# Patient Record
Sex: MALE | Race: WHITE | Employment: FULL TIME | URBAN - METROPOLITAN AREA
[De-identification: names, ages, dates, MRNs, and addresses within clinical notes are randomized per-mention and may not be internally consistent; named-entity substitution may affect disease eponyms.]

---

## 2020-05-07 ENCOUNTER — APPOINTMENT (OUTPATIENT)
Dept: GENERAL RADIOLOGY | Age: 55
DRG: 433 | End: 2020-05-07
Payer: COMMERCIAL

## 2020-05-07 ENCOUNTER — APPOINTMENT (OUTPATIENT)
Dept: ULTRASOUND IMAGING | Age: 55
DRG: 433 | End: 2020-05-07
Payer: COMMERCIAL

## 2020-05-07 ENCOUNTER — HOSPITAL ENCOUNTER (INPATIENT)
Age: 55
LOS: 5 days | Discharge: HOME | DRG: 433 | End: 2020-05-12
Attending: EMERGENCY MEDICINE | Admitting: INTERNAL MEDICINE
Payer: COMMERCIAL

## 2020-05-07 ENCOUNTER — APPOINTMENT (OUTPATIENT)
Dept: CT IMAGING | Age: 55
DRG: 433 | End: 2020-05-07
Payer: COMMERCIAL

## 2020-05-07 LAB
ABO/RH: NORMAL
ACETAMINOPHEN LEVEL: <5 MCG/ML (ref 10–30)
ALBUMIN FLUID: 0.8 G/DL
ALBUMIN SERPL-MCNC: 3.1 G/DL (ref 3.5–5.2)
ALP BLD-CCNC: 184 U/L (ref 40–129)
ALT SERPL-CCNC: 51 U/L (ref 0–40)
AMMONIA: 51 UMOL/L (ref 16–60)
AMYLASE FLUID: 7 U/L
ANION GAP SERPL CALCULATED.3IONS-SCNC: 19 MMOL/L (ref 7–16)
ANISOCYTOSIS: ABNORMAL
ANTIBODY SCREEN: NORMAL
APPEARANCE FLUID: CLEAR
APTT: 32.6 SEC (ref 24.5–35.1)
AST SERPL-CCNC: 285 U/L (ref 0–39)
BASOPHILS ABSOLUTE: 0.08 E9/L (ref 0–0.2)
BASOPHILS RELATIVE PERCENT: 0.9 % (ref 0–2)
BILIRUB SERPL-MCNC: 4.8 MG/DL (ref 0–1.2)
BILIRUBIN DIRECT: 1.3 MG/DL (ref 0–0.3)
BILIRUBIN, INDIRECT: 3.5 MG/DL (ref 0–1)
BUN BLDV-MCNC: 13 MG/DL (ref 6–20)
CALCIUM SERPL-MCNC: 8.8 MG/DL (ref 8.6–10.2)
CELL COUNT FLUID TYPE: NORMAL
CHLORIDE BLD-SCNC: 83 MMOL/L (ref 98–107)
CO2: 24 MMOL/L (ref 22–29)
COLOR FLUID: YELLOW
CREAT SERPL-MCNC: 0.6 MG/DL (ref 0.7–1.2)
EOSINOPHILS ABSOLUTE: 0 E9/L (ref 0.05–0.5)
EOSINOPHILS RELATIVE PERCENT: 0.1 % (ref 0–6)
ETHANOL: 24 MG/DL (ref 0–0.08)
FLUID TYPE: NORMAL
GFR AFRICAN AMERICAN: >60
GFR NON-AFRICAN AMERICAN: >60 ML/MIN/1.73
GLUCOSE BLD-MCNC: 116 MG/DL (ref 74–99)
HCT VFR BLD CALC: 34.5 % (ref 37–54)
HEMOGLOBIN: 12.4 G/DL (ref 12.5–16.5)
INR BLD: 1.4
LACTIC ACID: 4.9 MMOL/L (ref 0.5–2.2)
LACTIC ACID: 6.3 MMOL/L (ref 0.5–2.2)
LD, FLUID: 60 U/L
LIPASE: 46 U/L (ref 13–60)
LYMPHOCYTES ABSOLUTE: 0.55 E9/L (ref 1.5–4)
LYMPHOCYTES RELATIVE PERCENT: 6.1 % (ref 20–42)
MCH RBC QN AUTO: 38.9 PG (ref 26–35)
MCHC RBC AUTO-ENTMCNC: 35.9 % (ref 32–34.5)
MCV RBC AUTO: 108.2 FL (ref 80–99.9)
MONOCYTE, FLUID: 89 %
MONOCYTES ABSOLUTE: 0.18 E9/L (ref 0.1–0.95)
MONOCYTES RELATIVE PERCENT: 1.7 % (ref 2–12)
NEUTROPHIL, FLUID: 11 %
NEUTROPHILS ABSOLUTE: 8.28 E9/L (ref 1.8–7.3)
NEUTROPHILS RELATIVE PERCENT: 91.3 % (ref 43–80)
NUCLEATED CELLS FLUID: 18 /UL
OSMOLALITY: 277 MOSM/KG (ref 285–310)
PDW BLD-RTO: 18.4 FL (ref 11.5–15)
PLATELET # BLD: 147 E9/L (ref 130–450)
PMV BLD AUTO: 11 FL (ref 7–12)
POIKILOCYTES: ABNORMAL
POTASSIUM REFLEX MAGNESIUM: 5.6 MMOL/L (ref 3.5–5)
PRO-BNP: 353 PG/ML (ref 0–125)
PROCALCITONIN: 0.19 NG/ML (ref 0–0.08)
PROTEIN FLUID: 1.4 G/DL
PROTHROMBIN TIME: 16 SEC (ref 9.3–12.4)
RBC # BLD: 3.19 E12/L (ref 3.8–5.8)
RBC FLUID: <2000 /UL
SALICYLATE, SERUM: <0.3 MG/DL (ref 0–30)
SEDIMENTATION RATE, ERYTHROCYTE: 100 MM/HR (ref 0–15)
SODIUM BLD-SCNC: 126 MMOL/L (ref 132–146)
TARGET CELLS: ABNORMAL
TOTAL PROTEIN: 8.7 G/DL (ref 6.4–8.3)
TRICYCLIC ANTIDEPRESSANTS SCREEN SERUM: NEGATIVE NG/ML
TROPONIN: <0.01 NG/ML (ref 0–0.03)
WBC # BLD: 9.1 E9/L (ref 4.5–11.5)

## 2020-05-07 PROCEDURE — 84157 ASSAY OF PROTEIN OTHER: CPT

## 2020-05-07 PROCEDURE — 83880 ASSAY OF NATRIURETIC PEPTIDE: CPT

## 2020-05-07 PROCEDURE — 85610 PROTHROMBIN TIME: CPT

## 2020-05-07 PROCEDURE — 6360000002 HC RX W HCPCS: Performed by: INTERNAL MEDICINE

## 2020-05-07 PROCEDURE — 87040 BLOOD CULTURE FOR BACTERIA: CPT

## 2020-05-07 PROCEDURE — 2500000003 HC RX 250 WO HCPCS: Performed by: INTERNAL MEDICINE

## 2020-05-07 PROCEDURE — 49083 ABD PARACENTESIS W/IMAGING: CPT

## 2020-05-07 PROCEDURE — 74177 CT ABD & PELVIS W/CONTRAST: CPT

## 2020-05-07 PROCEDURE — 6360000002 HC RX W HCPCS: Performed by: STUDENT IN AN ORGANIZED HEALTH CARE EDUCATION/TRAINING PROGRAM

## 2020-05-07 PROCEDURE — 71046 X-RAY EXAM CHEST 2 VIEWS: CPT

## 2020-05-07 PROCEDURE — 85025 COMPLETE CBC W/AUTO DIFF WBC: CPT

## 2020-05-07 PROCEDURE — 76705 ECHO EXAM OF ABDOMEN: CPT

## 2020-05-07 PROCEDURE — 83690 ASSAY OF LIPASE: CPT

## 2020-05-07 PROCEDURE — 83605 ASSAY OF LACTIC ACID: CPT

## 2020-05-07 PROCEDURE — 84145 PROCALCITONIN (PCT): CPT

## 2020-05-07 PROCEDURE — 80307 DRUG TEST PRSMV CHEM ANLYZR: CPT

## 2020-05-07 PROCEDURE — 89051 BODY FLUID CELL COUNT: CPT

## 2020-05-07 PROCEDURE — 87070 CULTURE OTHR SPECIMN AEROBIC: CPT

## 2020-05-07 PROCEDURE — 1200000000 HC SEMI PRIVATE

## 2020-05-07 PROCEDURE — 0W9G3ZZ DRAINAGE OF PERITONEAL CAVITY, PERCUTANEOUS APPROACH: ICD-10-PCS | Performed by: EMERGENCY MEDICINE

## 2020-05-07 PROCEDURE — 86900 BLOOD TYPING SEROLOGIC ABO: CPT

## 2020-05-07 PROCEDURE — 96365 THER/PROPH/DIAG IV INF INIT: CPT

## 2020-05-07 PROCEDURE — 85730 THROMBOPLASTIN TIME PARTIAL: CPT

## 2020-05-07 PROCEDURE — 83540 ASSAY OF IRON: CPT

## 2020-05-07 PROCEDURE — 99285 EMERGENCY DEPT VISIT HI MDM: CPT

## 2020-05-07 PROCEDURE — 88305 TISSUE EXAM BY PATHOLOGIST: CPT

## 2020-05-07 PROCEDURE — 80076 HEPATIC FUNCTION PANEL: CPT

## 2020-05-07 PROCEDURE — 87205 SMEAR GRAM STAIN: CPT

## 2020-05-07 PROCEDURE — 86850 RBC ANTIBODY SCREEN: CPT

## 2020-05-07 PROCEDURE — 94640 AIRWAY INHALATION TREATMENT: CPT

## 2020-05-07 PROCEDURE — 6360000004 HC RX CONTRAST MEDICATION: Performed by: RADIOLOGY

## 2020-05-07 PROCEDURE — 80074 ACUTE HEPATITIS PANEL: CPT

## 2020-05-07 PROCEDURE — 83930 ASSAY OF BLOOD OSMOLALITY: CPT

## 2020-05-07 PROCEDURE — 82390 ASSAY OF CERULOPLASMIN: CPT

## 2020-05-07 PROCEDURE — 2580000003 HC RX 258: Performed by: INTERNAL MEDICINE

## 2020-05-07 PROCEDURE — P9047 ALBUMIN (HUMAN), 25%, 50ML: HCPCS | Performed by: STUDENT IN AN ORGANIZED HEALTH CARE EDUCATION/TRAINING PROGRAM

## 2020-05-07 PROCEDURE — C9113 INJ PANTOPRAZOLE SODIUM, VIA: HCPCS | Performed by: INTERNAL MEDICINE

## 2020-05-07 PROCEDURE — 88112 CYTOPATH CELL ENHANCE TECH: CPT

## 2020-05-07 PROCEDURE — 86901 BLOOD TYPING SEROLOGIC RH(D): CPT

## 2020-05-07 PROCEDURE — 82042 OTHER SOURCE ALBUMIN QUAN EA: CPT

## 2020-05-07 PROCEDURE — 82728 ASSAY OF FERRITIN: CPT

## 2020-05-07 PROCEDURE — 83615 LACTATE (LD) (LDH) ENZYME: CPT

## 2020-05-07 PROCEDURE — 36415 COLL VENOUS BLD VENIPUNCTURE: CPT

## 2020-05-07 PROCEDURE — G0480 DRUG TEST DEF 1-7 CLASSES: HCPCS

## 2020-05-07 PROCEDURE — 84484 ASSAY OF TROPONIN QUANT: CPT

## 2020-05-07 PROCEDURE — 85651 RBC SED RATE NONAUTOMATED: CPT

## 2020-05-07 PROCEDURE — 82150 ASSAY OF AMYLASE: CPT

## 2020-05-07 PROCEDURE — 93005 ELECTROCARDIOGRAM TRACING: CPT | Performed by: STUDENT IN AN ORGANIZED HEALTH CARE EDUCATION/TRAINING PROGRAM

## 2020-05-07 PROCEDURE — 74018 RADEX ABDOMEN 1 VIEW: CPT

## 2020-05-07 PROCEDURE — 83550 IRON BINDING TEST: CPT

## 2020-05-07 PROCEDURE — 82140 ASSAY OF AMMONIA: CPT

## 2020-05-07 PROCEDURE — 80048 BASIC METABOLIC PNL TOTAL CA: CPT

## 2020-05-07 RX ORDER — GUAIFENESIN 600 MG/1
600 TABLET, EXTENDED RELEASE ORAL 2 TIMES DAILY
Status: DISCONTINUED | OUTPATIENT
Start: 2020-05-07 | End: 2020-05-12 | Stop reason: HOSPADM

## 2020-05-07 RX ORDER — SODIUM CHLORIDE 0.9 % (FLUSH) 0.9 %
10 SYRINGE (ML) INJECTION EVERY 12 HOURS SCHEDULED
Status: DISCONTINUED | OUTPATIENT
Start: 2020-05-07 | End: 2020-05-12 | Stop reason: HOSPADM

## 2020-05-07 RX ORDER — ALBUMIN, HUMAN INJ 5% 5 %
25 SOLUTION INTRAVENOUS ONCE
Status: DISCONTINUED | OUTPATIENT
Start: 2020-05-07 | End: 2020-05-07

## 2020-05-07 RX ORDER — OCTREOTIDE ACETATE 50 UG/ML
50 INJECTION, SOLUTION INTRAVENOUS; SUBCUTANEOUS ONCE
Status: COMPLETED | OUTPATIENT
Start: 2020-05-07 | End: 2020-05-08

## 2020-05-07 RX ORDER — ACETAMINOPHEN 650 MG/1
650 SUPPOSITORY RECTAL EVERY 6 HOURS PRN
Status: CANCELLED | OUTPATIENT
Start: 2020-05-07

## 2020-05-07 RX ORDER — FOLIC ACID 1 MG/1
1 TABLET ORAL DAILY
Status: DISCONTINUED | OUTPATIENT
Start: 2020-05-08 | End: 2020-05-10

## 2020-05-07 RX ORDER — NADOLOL 20 MG/1
20 TABLET ORAL DAILY
Status: DISCONTINUED | OUTPATIENT
Start: 2020-05-07 | End: 2020-05-09

## 2020-05-07 RX ORDER — ALBUMIN (HUMAN) 12.5 G/50ML
50 SOLUTION INTRAVENOUS ONCE
Status: COMPLETED | OUTPATIENT
Start: 2020-05-07 | End: 2020-05-07

## 2020-05-07 RX ORDER — PANTOPRAZOLE SODIUM 40 MG/10ML
40 INJECTION, POWDER, LYOPHILIZED, FOR SOLUTION INTRAVENOUS 2 TIMES DAILY
Status: DISCONTINUED | OUTPATIENT
Start: 2020-05-07 | End: 2020-05-08

## 2020-05-07 RX ORDER — SODIUM CHLORIDE 0.9 % (FLUSH) 0.9 %
10 SYRINGE (ML) INJECTION PRN
Status: DISCONTINUED | OUTPATIENT
Start: 2020-05-07 | End: 2020-05-12 | Stop reason: HOSPADM

## 2020-05-07 RX ORDER — ALBUTEROL SULFATE 2.5 MG/3ML
2.5 SOLUTION RESPIRATORY (INHALATION) EVERY 4 HOURS PRN
Status: DISCONTINUED | OUTPATIENT
Start: 2020-05-07 | End: 2020-05-12 | Stop reason: HOSPADM

## 2020-05-07 RX ORDER — THIAMINE MONONITRATE (VIT B1) 100 MG
100 TABLET ORAL DAILY
Status: DISCONTINUED | OUTPATIENT
Start: 2020-05-08 | End: 2020-05-12 | Stop reason: HOSPADM

## 2020-05-07 RX ORDER — ACETAMINOPHEN 325 MG/1
650 TABLET ORAL EVERY 6 HOURS PRN
Status: CANCELLED | OUTPATIENT
Start: 2020-05-07

## 2020-05-07 RX ORDER — SODIUM CHLORIDE 9 MG/ML
10 INJECTION INTRAVENOUS 2 TIMES DAILY
Status: DISCONTINUED | OUTPATIENT
Start: 2020-05-07 | End: 2020-05-08

## 2020-05-07 RX ADMIN — ALBUTEROL SULFATE 2.5 MG: 2.5 SOLUTION RESPIRATORY (INHALATION) at 23:31

## 2020-05-07 RX ADMIN — IOPAMIDOL 75 ML: 755 INJECTION, SOLUTION INTRAVENOUS at 22:14

## 2020-05-07 RX ADMIN — METRONIDAZOLE 500 MG: 500 INJECTION, SOLUTION INTRAVENOUS at 22:57

## 2020-05-07 RX ADMIN — WATER 1 G: 1 INJECTION INTRAMUSCULAR; INTRAVENOUS; SUBCUTANEOUS at 21:43

## 2020-05-07 RX ADMIN — SODIUM CHLORIDE 10 ML: 9 INJECTION INTRAMUSCULAR; INTRAVENOUS; SUBCUTANEOUS at 21:52

## 2020-05-07 RX ADMIN — PANTOPRAZOLE SODIUM 40 MG: 40 INJECTION, POWDER, FOR SOLUTION INTRAVENOUS at 21:52

## 2020-05-07 RX ADMIN — Medication 10 ML: at 21:43

## 2020-05-07 RX ADMIN — ALBUMIN (HUMAN) 50 G: 0.25 INJECTION, SOLUTION INTRAVENOUS at 18:10

## 2020-05-07 ASSESSMENT — ENCOUNTER SYMPTOMS
EYE DISCHARGE: 0
RECTAL PAIN: 0
EYE PAIN: 0
COUGH: 0
CHOKING: 0
EYE REDNESS: 0
STRIDOR: 0
WHEEZING: 0
PHOTOPHOBIA: 0
DIARRHEA: 0
CONSTIPATION: 0
ABDOMINAL PAIN: 0
SHORTNESS OF BREATH: 0
SINUS PRESSURE: 0
NAUSEA: 0
VOMITING: 0
BACK PAIN: 0
BLOOD IN STOOL: 0
SORE THROAT: 0
CHEST TIGHTNESS: 0
APNEA: 0
COLOR CHANGE: 0
ABDOMINAL DISTENTION: 0

## 2020-05-07 ASSESSMENT — PAIN DESCRIPTION - DESCRIPTORS: DESCRIPTORS: ACHING;DISCOMFORT;SORE

## 2020-05-07 ASSESSMENT — PAIN DESCRIPTION - PAIN TYPE: TYPE: ACUTE PAIN

## 2020-05-07 ASSESSMENT — PAIN DESCRIPTION - PROGRESSION: CLINICAL_PROGRESSION: GRADUALLY WORSENING

## 2020-05-07 ASSESSMENT — PAIN DESCRIPTION - FREQUENCY: FREQUENCY: INTERMITTENT

## 2020-05-07 ASSESSMENT — PAIN DESCRIPTION - LOCATION: LOCATION: LEG

## 2020-05-07 ASSESSMENT — PAIN DESCRIPTION - ORIENTATION: ORIENTATION: RIGHT;LEFT

## 2020-05-07 ASSESSMENT — PAIN DESCRIPTION - ONSET: ONSET: GRADUAL

## 2020-05-07 ASSESSMENT — PAIN - FUNCTIONAL ASSESSMENT: PAIN_FUNCTIONAL_ASSESSMENT: PREVENTS OR INTERFERES SOME ACTIVE ACTIVITIES AND ADLS

## 2020-05-07 ASSESSMENT — PAIN SCALES - GENERAL: PAINLEVEL_OUTOF10: 6

## 2020-05-07 NOTE — ED NOTES
The procedural consent form for the abdominal paracentesis was signed by the pt and placed into his lite chart.       Ian Palm RN  05/07/20 0690

## 2020-05-07 NOTE — ED PROVIDER NOTES
72-year-old male with a past medical history of alcoholic liver cirrhosis not on the liver transplant list who is a daily drinker presents with abdominal swelling. Patient states for the last 3 months he has had aggressively worsening abdominal distention. States that he is gained 80 pounds. Per patient, he was previously seen at [facility] in February where he was peritoneal tap. He says he got 4 L of fluid out of his belly. States that since that time he is progressively worsened. No alleviating or exacerbating factors. Does admit to shortness of breath concomittant with his abdominal distention. Denies fever, chills, nausea, vomiting, chest pain, abdominal pain, dysuria, hematuria, diarrhea and constipation. The history is provided by the patient. Review of Systems   Constitutional: Negative for activity change, appetite change, chills, diaphoresis, fatigue, fever and unexpected weight change. HENT: Negative for congestion, ear discharge, ear pain, mouth sores, sinus pressure, sneezing and sore throat. Eyes: Negative for photophobia, pain, discharge, redness and visual disturbance. Respiratory: Negative for apnea, cough, choking, chest tightness, shortness of breath, wheezing and stridor. Cardiovascular: Negative for chest pain and leg swelling. Gastrointestinal: Negative for abdominal distention, abdominal pain, blood in stool, constipation, diarrhea, nausea, rectal pain and vomiting. Genitourinary: Negative for dysuria and frequency. Musculoskeletal: Negative for arthralgias, back pain, neck pain and neck stiffness. Skin: Negative for color change, pallor, rash and wound. Neurological: Negative for dizziness, facial asymmetry, weakness and headaches. Hematological: Negative for adenopathy. Psychiatric/Behavioral: Negative for agitation, behavioral problems and confusion. All other systems reviewed and are negative.        Physical Exam  Vitals signs and nursing note Mental Status: He is alert and oriented to person, place, and time. Cranial Nerves: No cranial nerve deficit. Sensory: No sensory deficit. Motor: No weakness. Coordination: Coordination normal.   Psychiatric:         Mood and Affect: Mood normal.                      Procedures     MDM  Number of Diagnoses or Management Options  Alcoholic cirrhosis of liver with ascites [facility]):   Diagnosis management comments: 82-year-old male past medical history of liver cirrhosis presents with abdominal distention. Patient is being evaluated for but not limited to liver cirrhosis with ascites, SBP, and hepatorenal syndrome. Depending on the patient's INR patient will have a paracentesis in the department. Patient's INR is 1.4. Patient was consented to have a paracentesis done department. A total of 5 and half liters of clear yellow fluid have been drained from the patient. 50 mg of albumin were given. Patient       ED Course as of May 07 2336   Thu May 07, 2020   1539 Paracentesis in the department depending on his coag factors. He is currently sitting comfortably in room. [JV]   1559 EKG read by me. Sinus tachycardia. Normal axis deviation. INR: 1.4 [JV]   1627 Troponin: <0.01 [JV]   1627 Patient is currently giving consent. Patient will get albumin during tap. Albumin(!): 3.1 [JV]   1631 Meld score of 23 which implies a 19.6% 3-month mortality    [JV]   1825 Bedside paracentesis performed. Using ultrasound. 5 and half liters of ascites has been removed from patient's abdomen. On physical exam his abdomen is less tense. Patient states that he feels \"much better. \"    [JV]   [phone] with Dr. Xavi Rooney. He will accept patient. Will speak to Dr. Vasyl Mcbride resident about patient admission. Massiel Mcbride will evaluate patient in the department. Patient is currently afebrile in no acute distress.      [JV]   1901 He was informed that he will be admitted to the hospital for observation. He is agreeable with plan. [JV]      ED Course User Index  [JV] Yeimi Larsen MD      ED Course as of May 07 2336   Thu May 07, 2020   1539 Paracentesis in the department depending on his coag factors. He is currently sitting comfortably in room. [JV]   1559 EKG read by me. Sinus tachycardia. Normal axis deviation. INR: 1.4 [JV]   1627 Troponin: <0.01 [JV]   1627 Patient is currently giving consent. Patient will get albumin during tap. Albumin(!): 3.1 [JV]   1631 Meld score of 23 which implies a 19.6% 3-month mortality    [JV]   1825 Bedside paracentesis performed. Using ultrasound. 5 and half liters of ascites has been removed from patient's abdomen. On physical exam his abdomen is less tense. Patient states that he feels \"much better. \"    [JV]   [phone] with Dr. Juany Bo. He will accept patient. Will speak to Dr. Jax Sheikh resident about patient admission. Shante Sheikh will evaluate patient in the department. Patient is currently afebrile in no acute distress. [JV]   1907 He was informed that he will be admitted to the hospital for observation. He is agreeable with plan. [JV]      ED Course User Index  [JV] Yeimi Larsen MD       --------------------------------------------- PAST HISTORY ---------------------------------------------  Past Medical History:  has a past medical history of Alcoholism ([address] Ascites, Cirrhosis ([facility] [address] Hypertension, and Noncompliance. Past Surgical History:  has a past surgical history that includes Abdominal exploration surgery (03/22/2014); Paracentesis (2017); Paracentesis (2014); Abdomen surgery; and Endoscopy, colon, diagnostic. Social History:  reports that he has been smoking cigars. He has a 20.00 pack-year smoking history. His smokeless tobacco use includes chew. He reports current alcohol use of about 21.0 standard drinks of alcohol per week. He reports that he does not use drugs.     Family History: family history includes Diabetes in his father; Heart Disease in his mother; High Blood Pressure in his maternal grandfather and maternal grandmother; Other in his sister. The patients home medications have been reviewed. Allergies: Patient has no known allergies.     -------------------------------------------------- RESULTS -------------------------------------------------    LABS:  Results for orders placed or performed during the hospital encounter of 05/07/20   CBC Auto Differential   Result Value Ref Range    WBC 9.1 4.5 - 11.5 E9/L    RBC 3.19 (L) 3.80 - 5.80 E12/L    Hemoglobin 12.4 (L) 12.5 - 16.5 g/dL    Hematocrit 34.5 (L) 37.0 - 54.0 %    .2 (H) 80.0 - 99.9 fL    MCH 38.9 (H) 26.0 - 35.0 pg    MCHC 35.9 (H) 32.0 - 34.5 %    RDW 18.4 (H) 11.5 - 15.0 fL    Platelets 847 742 - 088 E9/L    MPV 11.0 7.0 - 12.0 fL    Neutrophils % 91.3 (H) 43.0 - 80.0 %    Lymphocytes % 6.1 (L) 20.0 - 42.0 %    Monocytes % 1.7 (L) 2.0 - 12.0 %    Eosinophils % 0.1 0.0 - 6.0 %    Basophils % 0.9 0.0 - 2.0 %    Neutrophils Absolute 8.28 (H) 1.80 - 7.30 E9/L    Lymphocytes Absolute 0.55 (L) 1.50 - 4.00 E9/L    Monocytes Absolute 0.18 0.10 - 0.95 E9/L    Eosinophils Absolute 0.00 (L) 0.05 - 0.50 E9/L    Basophils Absolute 0.08 0.00 - 0.20 E9/L    Anisocytosis 2+     Poikilocytes 1+     Target Cells 1+    Basic Metabolic Panel w/ Reflex to MG   Result Value Ref Range    Sodium 126 (L) 132 - 146 mmol/L    Potassium reflex Magnesium 5.6 (H) 3.5 - 5.0 mmol/L    Chloride 83 (L) 98 - 107 mmol/L    CO2 24 22 - 29 mmol/L    Anion Gap 19 (H) 7 - 16 mmol/L    Glucose 116 (H) 74 - 99 mg/dL    BUN 13 6 - 20 mg/dL    CREATININE 0.6 (L) 0.7 - 1.2 mg/dL    GFR Non-African American >60 >=60 mL/min/1.73    GFR African American >60     Calcium 8.8 8.6 - 10.2 mg/dL   Hepatic Function Panel   Result Value Ref Range    Total Protein 8.7 (H) 6.4 - 8.3 g/dL    Alb 3.1 (L) 3.5 - 5.2 g/dL    Alkaline Phosphatase 184 (H) 40 - 129 U/L    ALT 51 (H) 0 - 40 U/L     (H) 0 - 39 U/L    Total Bilirubin 4.8 (H) 0.0 - 1.2 mg/dL    Bilirubin, Direct 1.3 (H) 0.0 - 0.3 mg/dL    Bilirubin, Indirect 3.5 (H) 0.0 - 1.0 mg/dL   Protime-INR   Result Value Ref Range    Protime 16.0 (H) 9.3 - 12.4 sec    INR 1.4    APTT   Result Value Ref Range    aPTT 32.6 24.5 - 35.1 sec   Lactic Acid, Plasma   Result Value Ref Range    Lactic Acid 6.3 (HH) 0.5 - 2.2 mmol/L   Troponin   Result Value Ref Range    Troponin <0.01 0.00 - 0.03 ng/mL   Brain Natriuretic Peptide   Result Value Ref Range    Pro- (H) 0 - 125 pg/mL   Sedimentation Rate   Result Value Ref Range    Sed Rate 100 (H) 0 - 15 mm/Hr   Ammonia   Result Value Ref Range    Ammonia 51.0 16.0 - 60.0 umol/L   Lipase   Result Value Ref Range    Lipase 46 13 - 60 U/L   Serum Drug Screen   Result Value Ref Range    Ethanol Lvl 24 mg/dL    Acetaminophen Level <5.0 (L) 10.0 - 70.1 mcg/mL    Salicylate, Serum <1.7 0.0 - 30.0 mg/dL    TCA Scrn NEGATIVE Cutoff:300 ng/mL   Lactic Acid, Plasma   Result Value Ref Range    Lactic Acid 4.9 (HH) 0.5 - 2.2 mmol/L   Procalcitonin   Result Value Ref Range    Procalcitonin 0.19 (H) 0.00 - 0.08 ng/mL   Body fluid cell count with differential   Result Value Ref Range    Cell Count Fluid Type Ascites     Color, Fluid Yellow     Appearance, Fluid Clear     Nucl Cell, Fluid 18 /uL    RBC, Fluid <2,000 /uL    Neutrophil Count, Fluid 11 %    Monocyte Count, Fluid 89 %   Amylase, body fluid   Result Value Ref Range    Amylase, Fluid 7 Not Established U/L   Albumin, fluid   Result Value Ref Range    Albumin, Fluid 0.8 Not Established g/dL    Fluid Type Ascites    Protein, body fluid   Result Value Ref Range    Protein, Fluid 1.4 Not Established g/dL   Lactate dehydrogenase, body fluid   Result Value Ref Range    LD, Fluid 60 Not Established U/L   OSMOLALITY, SERUM   Result Value Ref Range    Osmolality 277 (L) 285 - 310 mOsm/Kg   EKG 12 Lead   Result Value Ref Range    Ventricular Rate 113 BPM Kavon Wilson at 8:40 p.m. on   05/07/2020. IR INTERVENTIONAL RADIOLOGY PROCEDURE REQUEST    (Results Pending)       ------------------------- NURSING NOTES AND VITALS REVIEWED ---------------------------  Date / Time Roomed:  5/7/2020  2:16 PM  ED Bed Assignment:  [id number]    The nursing notes within the ED encounter and vital signs as below have been reviewed. Patient Vitals for the past 24 hrs:   BP Temp Temp src Pulse Resp SpO2 Height Weight   05/07/20 2135 -- -- -- -- -- 93 % -- --   05/07/20 2111 -- -- -- -- -- -- -- 279 lb (126.6 kg)   05/07/20 2104 136/62 99 °F (37.2 °C) Oral 114 19 91 % 5' 10\" (1.778 m) --   05/07/20 1906 (!) 149/78 -- -- 114 20 95 % -- --   05/07/20 1827 (!) 157/91 -- -- -- -- -- -- --   05/07/20 1824 (!) 140/87 -- -- 116 20 94 % -- --   05/07/20 1638 -- -- -- 116 20 99 % -- --   05/07/20 1418 (!) 179/103 98.7 °F (37.1 °C) -- 129 24 94 % -- --       Oxygen Saturation Interpretation: Normal    ------------------------------------------ PROGRESS NOTES ------------------------------------------    Counseling:  I have spoken with the patient and discussed todays results, in addition to providing specific details for the plan of care and counseling regarding the diagnosis and prognosis. Their questions are answered at this time and they are agreeable with the plan of admission.    --------------------------------- ADDITIONAL PROVIDER NOTES ---------------------------------  Consultations:  Spoke with Dr. Juany Bo. Discussed case. They will admit the patient. This patient's ED course included: a personal history and physicial examination, re-evaluation prior to disposition, multiple bedside re-evaluations, IV medications, cardiac monitoring and continuous pulse oximetry    This patient has remained hemodynamically stable during their ED course. Diagnosis:  1.  Alcoholic cirrhosis of liver with ascites ([facility] [address]        Disposition:  Patient's disposition: Admit to med/surg floor  Patient's condition is fair.            Mihir Garcia MD  Resident  05/07/20 8251

## 2020-05-07 NOTE — PROCEDURES
Paracentesis Procedure Note  Indication: Ascites    Consent: The patient was counseled regarding the procedure, it's indications, risks, potential complications and alternatives and any questions were answered. Consent was obtained. Procedure: The patient was placed in the supine position with the head of the bed slightly elevated and the appropriate landmarks were identified. The skin over the puncture site in the left lower quadrant region was prepped with betadine and draped in a sterile fashion. Local anesthesia was obtained by infiltration using 1% Lidocaine without epinephrine. A paracentesis catheter was then advanced into the abdominal cavity over a needle and the needle was withdrawn. Fluid was returned which was serous. A total volume of 5L was withdrawn. The catheter was then withdrawn and a sterile dressing was placed over the site. The patient tolerated the procedure well.     Complications: None

## 2020-05-07 NOTE — ED NOTES
The resident placed a dressing over the insertion site on the left lower abdomen after the procedure was completed. The area was leaking slightly. An Abdominal pad was placed over the area and taped in place.       Charlene Dobson RN  05/07/20 6580

## 2020-05-07 NOTE — ED NOTES
5.5 liters of fluid was removed during the paracentesis, the pt tolerated the procedure well.       Romario Ca RN  05/07/20 9067

## 2020-05-07 NOTE — H&P
[address] [facility]  Internal Medicine  -Resident History & Physical-    PCP:  No primary care provider on file. Admitting Physician:  Cathleen Borges DO  Consultants:  GI  Chief Complaint:    Chief Complaint   Patient presents with    Abdominal Pain     abd pain, distention, fluid collection pt in liver failure denies CP/SOB       History of Present Illness  Ricardo Newman is a 48 y.o. male who presents to [facility] ER complaining of abdominal distention. Ricardo Newman has a past medical history that includes alcoholic cirrhosis with massive ascites, alcohol abuse. Nicolas Welch presents to the ER with complaints of progressive abdominal distention and pain. He does have cirrhosis due to alcoholism and massive ascites. He requires paracentesis and his last paracentesis he states was in January of this year at [facility]. He states that since then he has gained 60 to 80 pounds. He states he has not been on any medications at home. He says he does not have a PCP. He admits to drinking 3 glasses of wine nightly. He states in the past he used to drink three gallons per week. He denies any fever or chills. He does have cough but states he smokes a lot of [employer] recently. He denies any exposure to known COVID-19 patients. He underwent therapeutic paracentesis in ER with 5.5L removed. He feels slightly more comfortable but is still massively distended. ER Course  Upon presentation to the ER, routine labwork was performed which revealed sodium of 126, potassium of 5.6 (hemolyzed), lactic acid 6.3, proBNP 353, alk phos 184, ALT 51, , bilirubin 4.8, indirect bilirubin 3.5, hemoglobin 12.4. Imaging results are as outlined below in the Imaging section of this note. EKG revealed sinus tachycardia with PACs. Upon arrival to the ER, patient was 179/103 and tachycardic 129.   The patient received albumin in the emergency room and was admitted to telemetry under the care of Dr. Anibal Molina and Black. Last Hospital Admission - 6/16/17  ADMITTING DIAGNOSIS:  Severely decompensated alcoholic cirrhosis with massive  ascites.     FINAL DISCHARGE DIAGNOSIS:  Severely decompensated alcoholic cirrhosis with  massive ascites and anasarca.     SECONDARY DISCHARGE DIAGNOSES:  Massive ascites due to severely decompensated  alcoholic cirrhosis, anasarca due to severely decompensated alcoholic  cirrhosis, chronic alcohol abuse with the patient staying abstinent for the  past 6 weeks, moderate protein caloric malnutrition, macrocytic anemia induced  by alcohol abuse. Last Echocardiogram -   None in EMR     ED TRIAGE VITALS  BP: (!) 157/91, Temp: 98.7 °F (37.1 °C), Pulse: 116, Resp: 20, SpO2: 94 %    Vitals:    05/07/20 1418 05/07/20 1638 05/07/20 1824 05/07/20 1827   BP: (!) 179/103  (!) 140/87 (!) 157/91   Pulse: 129 116 116    Resp: 24 20 20    Temp: 98.7 °F (37.1 °C)      SpO2: 94% 99% 94%          Histories  Past Medical History:   Diagnosis Date    Alcoholism ([address]     Ascites     Cirrhosis ([address]     Hypertension     Noncompliance      Past Surgical History:   Procedure Laterality Date    ABDOMINAL EXPLORATION SURGERY  03/22/2014    Exploration abdominal wall wound, partial omentectomy,  umbilical hernia repair, 5100cc ascitic fluid removed    PARACENTESIS  2017 5/4-10L; 5/8-10L; 6/15-10L    PARACENTESIS  2014    3/22-5L; 3/26-4L     Family History   Problem Relation Age of Onset    Heart Disease Mother     Diabetes Father     Other Sister     High Blood Pressure Maternal Grandmother     High Blood Pressure Maternal Grandfather        Home Medications  Prior to Admission medications    Not on File       Allergies  Patient has no known allergies.     Social Hx  Social History     Socioeconomic History    Marital status: Single     Spouse name: Not on file    Number of children: 0    Years of education: 12    Highest education level: Not on file   Occupational History    Not on file   Social Needs    Financial resource strain: Not on file    Food insecurity     Worry: Not on file     Inability: Not on file    Transportation needs     Medical: Not on file     Non-medical: Not on file   Tobacco Use    Smoking status: Current Every Day Smoker     Packs/day: 1.00     Years: 20.00     Pack years: 20.00     Types: Cigarettes    Smokeless tobacco: Current User     Types: Chew   Substance and Sexual Activity    Alcohol use: Yes     Comment: 3 gallon/Wk    Drug use: No    Sexual activity: Never   Lifestyle    Physical activity     Days per week: Not on file     Minutes per session: Not on file    Stress: Not on file   Relationships    Social connections     Talks on phone: Not on file     Gets together: Not on file     Attends Baptism service: Not on file     Active member of club or organization: Not on file     Attends meetings of clubs or organizations: Not on file     Relationship status: Not on file    Intimate partner violence     Fear of current or ex partner: Not on file     Emotionally abused: Not on file     Physically abused: Not on file     Forced sexual activity: Not on file   Other Topics Concern    Not on file   Social History Narrative    Not on file       Review of Systems  All bolded are positive; please see HPI  General:  Fever, chills, diaphoresis, fatigue, malaise, night sweats, weight loss  Psychological:  Anxiety, disorientation, hallucinations. ENT:  Epistaxis, headaches, vertigo, visual changes. Cardiovascular:  Chest pain, irregular heartbeats, palpitations, paroxysmal nocturnal dyspnea. Respiratory:  Shortness of breath, coughing, sputum production, hemoptysis, wheezing, orthopnea.   Gastrointestinal:  Nausea, vomiting, diarrhea, heartburn, constipation, abdominal pain, hematemesis, hematochezia, melena, acholic stools, distention  Genito-Urinary:  Dysuria, urgency, frequency, hematuria  Musculoskeletal:  Joint pain, joint stiffness, joint swelling, muscle (H) 43.0 - 80.0 %    Lymphocytes % 6.1 (L) 20.0 - 42.0 %    Monocytes % 1.7 (L) 2.0 - 12.0 %    Eosinophils % 0.1 0.0 - 6.0 %    Basophils % 0.9 0.0 - 2.0 %    Neutrophils Absolute 8.28 (H) 1.80 - 7.30 E9/L    Lymphocytes Absolute 0.55 (L) 1.50 - 4.00 E9/L    Monocytes Absolute 0.18 0.10 - 0.95 E9/L    Eosinophils Absolute 0.00 (L) 0.05 - 0.50 E9/L    Basophils Absolute 0.08 0.00 - 0.20 E9/L    Anisocytosis 2+     Poikilocytes 1+     Target Cells 1+    Basic Metabolic Panel w/ Reflex to MG    Collection Time: 05/07/20  3:17 PM   Result Value Ref Range    Sodium 126 (L) 132 - 146 mmol/L    Potassium reflex Magnesium 5.6 (H) 3.5 - 5.0 mmol/L    Chloride 83 (L) 98 - 107 mmol/L    CO2 24 22 - 29 mmol/L    Anion Gap 19 (H) 7 - 16 mmol/L    Glucose 116 (H) 74 - 99 mg/dL    BUN 13 6 - 20 mg/dL    CREATININE 0.6 (L) 0.7 - 1.2 mg/dL    GFR Non-African American >60 >=60 mL/min/1.73    GFR African American >60     Calcium 8.8 8.6 - 10.2 mg/dL   Hepatic Function Panel    Collection Time: 05/07/20  3:17 PM   Result Value Ref Range    Total Protein 8.7 (H) 6.4 - 8.3 g/dL    Alb 3.1 (L) 3.5 - 5.2 g/dL    Alkaline Phosphatase 184 (H) 40 - 129 U/L    ALT 51 (H) 0 - 40 U/L     (H) 0 - 39 U/L    Total Bilirubin 4.8 (H) 0.0 - 1.2 mg/dL    Bilirubin, Direct 1.3 (H) 0.0 - 0.3 mg/dL    Bilirubin, Indirect 3.5 (H) 0.0 - 1.0 mg/dL   Protime-INR    Collection Time: 05/07/20  3:17 PM   Result Value Ref Range    Protime 16.0 (H) 9.3 - 12.4 sec    INR 1.4    APTT    Collection Time: 05/07/20  3:17 PM   Result Value Ref Range    aPTT 32.6 24.5 - 35.1 sec   Lactic Acid, Plasma    Collection Time: 05/07/20  3:17 PM   Result Value Ref Range    Lactic Acid 6.3 (HH) 0.5 - 2.2 mmol/L   Troponin    Collection Time: 05/07/20  3:17 PM   Result Value Ref Range    Troponin <0.01 0.00 - 0.03 ng/mL   Brain Natriuretic Peptide    Collection Time: 05/07/20  3:17 PM   Result Value Ref Range    Pro- (H) 0 - 125 pg/mL   Sedimentation Rate Collection Time: 05/07/20  3:17 PM   Result Value Ref Range    Sed Rate 100 (H) 0 - 15 mm/Hr   Ammonia    Collection Time: 05/07/20  3:17 PM   Result Value Ref Range    Ammonia 51.0 16.0 - 60.0 umol/L   Lipase    Collection Time: 05/07/20  3:17 PM   Result Value Ref Range    Lipase 46 13 - 60 U/L   Serum Drug Screen    Collection Time: 05/07/20  3:17 PM   Result Value Ref Range    Ethanol Lvl 24 mg/dL    Acetaminophen Level <5.0 (L) 10.0 - 27.3 mcg/mL    Salicylate, Serum <6.0 0.0 - 30.0 mg/dL   EKG 12 Lead    Collection Time: 05/07/20  3:48 PM   Result Value Ref Range    Ventricular Rate 113 BPM    Atrial Rate 113 BPM    P-R Interval 148 ms    QRS Duration 96 ms    Q-T Interval 344 ms    QTc Calculation (Bazett) 471 ms    P Axis 56 degrees    R Axis 19 degrees    T Axis 0 degrees       Imaging  No results found. Assessment and Plan  Patient is a 48 y.o. male who presented with abdominal distention. The active problem list is as follows:    · Massive ascites due to severely decompensated alcoholic cirrhosis  · Lactic acidosis  · Chronic alcohol abuse  · Macrocytic anemia due to alcohol abuse  · Hyperbilirubinemia  · Hyponatremia  · COPD  · History of varices  · Tobacco abuse  · Noncompliance    -Underwent 5.5L paracentesis in ER, add on fluid studies  -Repeat paracentesis in am  -NPO after midnight  -Sandostatin drip  -Protonix 30mg BID  -Trend lactic acid  -Patient has not been on any medications at home  -History of varices, started nadolol  -Check CXR and KUB  -US liver  -Check hepatitis panel    -ADDENDUM: XR showing free air and possible bowel obstruction-- Check STAT lactic and consult general surgery. NPO        · Routine labs in the morning. · DVT prophylaxis. SCDs  · Please see orders for further management and care. The plan to be discussed with Dr. Jenny Willams.     [facility], PGYIII  7:02 PM  5/7/2020     I performed a history and physical examination of the patient and discussed the patient's management with the resident. I reviewed the resident's note and agree with the documented findings and plan of care.     Kimmy Gonzalez D.O., FACOI  8:00 PM  5/7/2020

## 2020-05-08 ENCOUNTER — APPOINTMENT (OUTPATIENT)
Dept: ULTRASOUND IMAGING | Age: 55
DRG: 433 | End: 2020-05-08
Payer: COMMERCIAL

## 2020-05-08 ENCOUNTER — ANESTHESIA (OUTPATIENT)
Dept: ENDOSCOPY | Age: 55
DRG: 433 | End: 2020-05-08
Payer: COMMERCIAL

## 2020-05-08 ENCOUNTER — ANESTHESIA EVENT (OUTPATIENT)
Dept: ENDOSCOPY | Age: 55
DRG: 433 | End: 2020-05-08
Payer: COMMERCIAL

## 2020-05-08 VITALS — OXYGEN SATURATION: 89 % | SYSTOLIC BLOOD PRESSURE: 101 MMHG | DIASTOLIC BLOOD PRESSURE: 51 MMHG

## 2020-05-08 LAB
ALBUMIN SERPL-MCNC: 2.8 G/DL (ref 3.5–5.2)
ALP BLD-CCNC: 143 U/L (ref 40–129)
ALT SERPL-CCNC: 39 U/L (ref 0–40)
AMMONIA: 80 UMOL/L (ref 16–60)
ANION GAP SERPL CALCULATED.3IONS-SCNC: 12 MMOL/L (ref 7–16)
ANION GAP SERPL CALCULATED.3IONS-SCNC: 14 MMOL/L (ref 7–16)
ANION GAP SERPL CALCULATED.3IONS-SCNC: 14 MMOL/L (ref 7–16)
AST SERPL-CCNC: 193 U/L (ref 0–39)
BILIRUB SERPL-MCNC: 5.4 MG/DL (ref 0–1.2)
BUN BLDV-MCNC: 11 MG/DL (ref 6–20)
BUN BLDV-MCNC: 13 MG/DL (ref 6–20)
BUN BLDV-MCNC: 13 MG/DL (ref 6–20)
CALCIUM SERPL-MCNC: 8.5 MG/DL (ref 8.6–10.2)
CALCIUM SERPL-MCNC: 8.6 MG/DL (ref 8.6–10.2)
CALCIUM SERPL-MCNC: 8.7 MG/DL (ref 8.6–10.2)
CHLORIDE BLD-SCNC: 87 MMOL/L (ref 98–107)
CHLORIDE BLD-SCNC: 87 MMOL/L (ref 98–107)
CHLORIDE BLD-SCNC: 89 MMOL/L (ref 98–107)
CHOLESTEROL, TOTAL: 100 MG/DL (ref 0–199)
CO2: 27 MMOL/L (ref 22–29)
CO2: 28 MMOL/L (ref 22–29)
CO2: 30 MMOL/L (ref 22–29)
CORTISOL TOTAL: 13.84 MCG/DL (ref 2.68–18.4)
CREAT SERPL-MCNC: 0.5 MG/DL (ref 0.7–1.2)
CREATININE URINE: 212 MG/DL (ref 40–278)
EKG ATRIAL RATE: 113 BPM
EKG P AXIS: 56 DEGREES
EKG P-R INTERVAL: 148 MS
EKG Q-T INTERVAL: 344 MS
EKG QRS DURATION: 96 MS
EKG QTC CALCULATION (BAZETT): 471 MS
EKG R AXIS: 19 DEGREES
EKG T AXIS: 0 DEGREES
EKG VENTRICULAR RATE: 113 BPM
FERRITIN: 2184 NG/ML
GFR AFRICAN AMERICAN: >60
GFR NON-AFRICAN AMERICAN: >60 ML/MIN/1.73
GLUCOSE BLD-MCNC: 106 MG/DL (ref 74–99)
GLUCOSE BLD-MCNC: 115 MG/DL (ref 74–99)
GLUCOSE BLD-MCNC: 184 MG/DL (ref 74–99)
HAV IGM SER IA-ACNC: NORMAL
HBA1C MFR BLD: 5.1 % (ref 4–5.6)
HCT VFR BLD CALC: 28.1 % (ref 37–54)
HCT VFR BLD CALC: 28.5 % (ref 37–54)
HCT VFR BLD CALC: 29.4 % (ref 37–54)
HDLC SERPL-MCNC: 7 MG/DL
HEMOGLOBIN: 10.2 G/DL (ref 12.5–16.5)
HEMOGLOBIN: 10.8 G/DL (ref 12.5–16.5)
HEMOGLOBIN: 9.9 G/DL (ref 12.5–16.5)
HEPATITIS B CORE IGM ANTIBODY: NORMAL
HEPATITIS B SURFACE ANTIGEN INTERPRETATION: NORMAL
HEPATITIS C ANTIBODY INTERPRETATION: NORMAL
INR BLD: 1.7
IRON SATURATION: ABNORMAL % (ref 20–55)
IRON: 109 MCG/DL (ref 59–158)
LACTIC ACID: 2.5 MMOL/L (ref 0.5–2.2)
LACTIC ACID: 2.6 MMOL/L (ref 0.5–2.2)
LACTIC ACID: 3.7 MMOL/L (ref 0.5–2.2)
LDL CHOLESTEROL CALCULATED: 69 MG/DL (ref 0–99)
LV EF: 67 %
LVEF MODALITY: NORMAL
MAGNESIUM: 1.7 MG/DL (ref 1.6–2.6)
MCH RBC QN AUTO: 38.8 PG (ref 26–35)
MCHC RBC AUTO-ENTMCNC: 36.3 % (ref 32–34.5)
MCV RBC AUTO: 106.8 FL (ref 80–99.9)
OSMOLALITY URINE: 724 MOSM/KG (ref 300–900)
PDW BLD-RTO: 18.1 FL (ref 11.5–15)
PHOSPHORUS: 2.5 MG/DL (ref 2.5–4.5)
PLATELET # BLD: 95 E9/L (ref 130–450)
PLATELET CONFIRMATION: NORMAL
PMV BLD AUTO: 10.4 FL (ref 7–12)
POTASSIUM SERPL-SCNC: 3.9 MMOL/L (ref 3.5–5)
POTASSIUM SERPL-SCNC: 4 MMOL/L (ref 3.5–5)
POTASSIUM SERPL-SCNC: 4.1 MMOL/L (ref 3.5–5)
PROTHROMBIN TIME: 19.5 SEC (ref 9.3–12.4)
RBC # BLD: 2.63 E12/L (ref 3.8–5.8)
SODIUM BLD-SCNC: 127 MMOL/L (ref 132–146)
SODIUM BLD-SCNC: 130 MMOL/L (ref 132–146)
SODIUM BLD-SCNC: 131 MMOL/L (ref 132–146)
SODIUM URINE: <20 MMOL/L
TOTAL IRON BINDING CAPACITY: ABNORMAL MCG/DL (ref 250–450)
TOTAL PROTEIN: 7.1 G/DL (ref 6.4–8.3)
TRIGL SERPL-MCNC: 122 MG/DL (ref 0–149)
TSH SERPL DL<=0.05 MIU/L-ACNC: 1.85 UIU/ML (ref 0.27–4.2)
URIC ACID, SERUM: 6.5 MG/DL (ref 3.4–7)
VLDLC SERPL CALC-MCNC: 24 MG/DL
WBC # BLD: 7.1 E9/L (ref 4.5–11.5)

## 2020-05-08 PROCEDURE — 85018 HEMOGLOBIN: CPT

## 2020-05-08 PROCEDURE — 2580000003 HC RX 258: Performed by: INTERNAL MEDICINE

## 2020-05-08 PROCEDURE — 6370000000 HC RX 637 (ALT 250 FOR IP): Performed by: INTERNAL MEDICINE

## 2020-05-08 PROCEDURE — 1200000000 HC SEMI PRIVATE

## 2020-05-08 PROCEDURE — 80061 LIPID PANEL: CPT

## 2020-05-08 PROCEDURE — 99254 IP/OBS CNSLTJ NEW/EST MOD 60: CPT | Performed by: SURGERY

## 2020-05-08 PROCEDURE — 6360000002 HC RX W HCPCS: Performed by: INTERNAL MEDICINE

## 2020-05-08 PROCEDURE — 2709999900 HC NON-CHARGEABLE SUPPLY: Performed by: INTERNAL MEDICINE

## 2020-05-08 PROCEDURE — P9047 ALBUMIN (HUMAN), 25%, 50ML: HCPCS

## 2020-05-08 PROCEDURE — 84207 ASSAY OF VITAMIN B-6: CPT

## 2020-05-08 PROCEDURE — 0W9G3ZZ DRAINAGE OF PERITONEAL CAVITY, PERCUTANEOUS APPROACH: ICD-10-PCS | Performed by: RADIOLOGY

## 2020-05-08 PROCEDURE — 2500000003 HC RX 250 WO HCPCS: Performed by: INTERNAL MEDICINE

## 2020-05-08 PROCEDURE — 83735 ASSAY OF MAGNESIUM: CPT

## 2020-05-08 PROCEDURE — 80053 COMPREHEN METABOLIC PANEL: CPT

## 2020-05-08 PROCEDURE — 83036 HEMOGLOBIN GLYCOSYLATED A1C: CPT

## 2020-05-08 PROCEDURE — 6360000002 HC RX W HCPCS

## 2020-05-08 PROCEDURE — 97535 SELF CARE MNGMENT TRAINING: CPT

## 2020-05-08 PROCEDURE — P9047 ALBUMIN (HUMAN), 25%, 50ML: HCPCS | Performed by: INTERNAL MEDICINE

## 2020-05-08 PROCEDURE — 85014 HEMATOCRIT: CPT

## 2020-05-08 PROCEDURE — 97530 THERAPEUTIC ACTIVITIES: CPT | Performed by: PHYSICAL THERAPIST

## 2020-05-08 PROCEDURE — 80202 ASSAY OF VANCOMYCIN: CPT

## 2020-05-08 PROCEDURE — 84100 ASSAY OF PHOSPHORUS: CPT

## 2020-05-08 PROCEDURE — 3609017100 HC EGD: Performed by: INTERNAL MEDICINE

## 2020-05-08 PROCEDURE — 84550 ASSAY OF BLOOD/URIC ACID: CPT

## 2020-05-08 PROCEDURE — 84300 ASSAY OF URINE SODIUM: CPT

## 2020-05-08 PROCEDURE — 7100000011 HC PHASE II RECOVERY - ADDTL 15 MIN: Performed by: INTERNAL MEDICINE

## 2020-05-08 PROCEDURE — 3700000001 HC ADD 15 MINUTES (ANESTHESIA): Performed by: INTERNAL MEDICINE

## 2020-05-08 PROCEDURE — 84443 ASSAY THYROID STIM HORMONE: CPT

## 2020-05-08 PROCEDURE — 36415 COLL VENOUS BLD VENIPUNCTURE: CPT

## 2020-05-08 PROCEDURE — 83605 ASSAY OF LACTIC ACID: CPT

## 2020-05-08 PROCEDURE — 6360000002 HC RX W HCPCS: Performed by: NURSE ANESTHETIST, CERTIFIED REGISTERED

## 2020-05-08 PROCEDURE — 85027 COMPLETE CBC AUTOMATED: CPT

## 2020-05-08 PROCEDURE — 97161 PT EVAL LOW COMPLEX 20 MIN: CPT | Performed by: PHYSICAL THERAPIST

## 2020-05-08 PROCEDURE — 3700000000 HC ANESTHESIA ATTENDED CARE: Performed by: INTERNAL MEDICINE

## 2020-05-08 PROCEDURE — C9113 INJ PANTOPRAZOLE SODIUM, VIA: HCPCS | Performed by: INTERNAL MEDICINE

## 2020-05-08 PROCEDURE — 82533 TOTAL CORTISOL: CPT

## 2020-05-08 PROCEDURE — 93306 TTE W/DOPPLER COMPLETE: CPT

## 2020-05-08 PROCEDURE — 85610 PROTHROMBIN TIME: CPT

## 2020-05-08 PROCEDURE — 80048 BASIC METABOLIC PNL TOTAL CA: CPT

## 2020-05-08 PROCEDURE — 94640 AIRWAY INHALATION TREATMENT: CPT

## 2020-05-08 PROCEDURE — 7100000010 HC PHASE II RECOVERY - FIRST 15 MIN: Performed by: INTERNAL MEDICINE

## 2020-05-08 PROCEDURE — 82105 ALPHA-FETOPROTEIN SERUM: CPT

## 2020-05-08 PROCEDURE — 0DJ08ZZ INSPECTION OF UPPER INTESTINAL TRACT, VIA NATURAL OR ARTIFICIAL OPENING ENDOSCOPIC: ICD-10-PCS | Performed by: INTERNAL MEDICINE

## 2020-05-08 PROCEDURE — 97116 GAIT TRAINING THERAPY: CPT | Performed by: PHYSICAL THERAPIST

## 2020-05-08 PROCEDURE — 6370000000 HC RX 637 (ALT 250 FOR IP): Performed by: STUDENT IN AN ORGANIZED HEALTH CARE EDUCATION/TRAINING PROGRAM

## 2020-05-08 PROCEDURE — 2580000003 HC RX 258: Performed by: NURSE ANESTHETIST, CERTIFIED REGISTERED

## 2020-05-08 PROCEDURE — 2700000000 HC OXYGEN THERAPY PER DAY

## 2020-05-08 PROCEDURE — 82140 ASSAY OF AMMONIA: CPT

## 2020-05-08 PROCEDURE — C1729 CATH, DRAINAGE: HCPCS

## 2020-05-08 PROCEDURE — 82570 ASSAY OF URINE CREATININE: CPT

## 2020-05-08 PROCEDURE — 97165 OT EVAL LOW COMPLEX 30 MIN: CPT

## 2020-05-08 PROCEDURE — 83935 ASSAY OF URINE OSMOLALITY: CPT

## 2020-05-08 RX ORDER — ALBUMIN (HUMAN) 12.5 G/50ML
50 SOLUTION INTRAVENOUS ONCE
Status: DISCONTINUED | OUTPATIENT
Start: 2020-05-08 | End: 2020-05-08

## 2020-05-08 RX ORDER — PROPOFOL 10 MG/ML
INJECTION, EMULSION INTRAVENOUS CONTINUOUS PRN
Status: DISCONTINUED | OUTPATIENT
Start: 2020-05-08 | End: 2020-05-08 | Stop reason: SDUPTHER

## 2020-05-08 RX ORDER — ALBUMIN (HUMAN) 12.5 G/50ML
100 SOLUTION INTRAVENOUS ONCE
Status: DISCONTINUED | OUTPATIENT
Start: 2020-05-08 | End: 2020-05-08

## 2020-05-08 RX ORDER — SPIRONOLACTONE 25 MG/1
50 TABLET ORAL 2 TIMES DAILY
Status: DISCONTINUED | OUTPATIENT
Start: 2020-05-08 | End: 2020-05-12 | Stop reason: HOSPADM

## 2020-05-08 RX ORDER — ALBUMIN (HUMAN) 12.5 G/50ML
SOLUTION INTRAVENOUS
Status: COMPLETED
Start: 2020-05-08 | End: 2020-05-08

## 2020-05-08 RX ORDER — ALBUMIN (HUMAN) 12.5 G/50ML
100 SOLUTION INTRAVENOUS ONCE
Status: COMPLETED | OUTPATIENT
Start: 2020-05-08 | End: 2020-05-08

## 2020-05-08 RX ORDER — ALBUMIN (HUMAN) 12.5 G/50ML
25 SOLUTION INTRAVENOUS EVERY 8 HOURS
Status: COMPLETED | OUTPATIENT
Start: 2020-05-08 | End: 2020-05-09

## 2020-05-08 RX ORDER — LACTULOSE 10 G/15ML
20 SOLUTION ORAL 3 TIMES DAILY
Status: DISCONTINUED | OUTPATIENT
Start: 2020-05-08 | End: 2020-05-12 | Stop reason: HOSPADM

## 2020-05-08 RX ORDER — ACETAMINOPHEN 325 MG/1
650 TABLET ORAL EVERY 4 HOURS PRN
Status: DISCONTINUED | OUTPATIENT
Start: 2020-05-08 | End: 2020-05-12 | Stop reason: HOSPADM

## 2020-05-08 RX ORDER — SODIUM CHLORIDE 9 MG/ML
INJECTION, SOLUTION INTRAVENOUS CONTINUOUS PRN
Status: DISCONTINUED | OUTPATIENT
Start: 2020-05-08 | End: 2020-05-08 | Stop reason: SDUPTHER

## 2020-05-08 RX ADMIN — SODIUM CHLORIDE: 9 INJECTION, SOLUTION INTRAVENOUS at 15:22

## 2020-05-08 RX ADMIN — SODIUM CHLORIDE, PRESERVATIVE FREE 10 ML: 5 INJECTION INTRAVENOUS at 07:04

## 2020-05-08 RX ADMIN — GUAIFENESIN 600 MG: 600 TABLET, EXTENDED RELEASE ORAL at 20:37

## 2020-05-08 RX ADMIN — OCTREOTIDE ACETATE 25 MCG/HR: 500 INJECTION, SOLUTION INTRAVENOUS; SUBCUTANEOUS at 00:14

## 2020-05-08 RX ADMIN — Medication 2000 MG: at 00:23

## 2020-05-08 RX ADMIN — ALBUMIN (HUMAN) 25 G: 0.25 INJECTION, SOLUTION INTRAVENOUS at 22:06

## 2020-05-08 RX ADMIN — RIFAXIMIN 550 MG: 550 TABLET ORAL at 20:37

## 2020-05-08 RX ADMIN — ALBUMIN (HUMAN) 100 G: 0.25 INJECTION, SOLUTION INTRAVENOUS at 11:46

## 2020-05-08 RX ADMIN — SODIUM CHLORIDE 8 MG/HR: 9 INJECTION, SOLUTION INTRAVENOUS at 18:26

## 2020-05-08 RX ADMIN — ALBUMIN (HUMAN) 50 G: 25 SOLUTION INTRAVENOUS at 12:05

## 2020-05-08 RX ADMIN — LACTULOSE 20 G: 20 SOLUTION ORAL at 20:37

## 2020-05-08 RX ADMIN — OCTREOTIDE ACETATE 50 MCG: 50 INJECTION, SOLUTION INTRAVENOUS; SUBCUTANEOUS at 00:09

## 2020-05-08 RX ADMIN — METRONIDAZOLE 500 MG: 500 INJECTION, SOLUTION INTRAVENOUS at 16:27

## 2020-05-08 RX ADMIN — PROPOFOL 75 MCG/KG/MIN: 10 INJECTION, EMULSION INTRAVENOUS at 15:25

## 2020-05-08 RX ADMIN — Medication 10 ML: at 20:38

## 2020-05-08 RX ADMIN — ALBUTEROL SULFATE 2.5 MG: 2.5 SOLUTION RESPIRATORY (INHALATION) at 15:51

## 2020-05-08 RX ADMIN — NADOLOL 20 MG: 20 TABLET ORAL at 22:04

## 2020-05-08 RX ADMIN — ALBUMIN (HUMAN) 50 G: 12.5 SOLUTION INTRAVENOUS at 12:05

## 2020-05-08 RX ADMIN — Medication 2000 MG: at 13:52

## 2020-05-08 RX ADMIN — METRONIDAZOLE 500 MG: 500 INJECTION, SOLUTION INTRAVENOUS at 07:04

## 2020-05-08 RX ADMIN — OCTREOTIDE ACETATE 50 MCG/HR: 500 INJECTION, SOLUTION INTRAVENOUS; SUBCUTANEOUS at 14:41

## 2020-05-08 RX ADMIN — WATER 1 G: 1 INJECTION INTRAMUSCULAR; INTRAVENOUS; SUBCUTANEOUS at 20:39

## 2020-05-08 RX ADMIN — SPIRONOLACTONE 50 MG: 25 TABLET ORAL at 18:43

## 2020-05-08 RX ADMIN — METRONIDAZOLE 500 MG: 500 INJECTION, SOLUTION INTRAVENOUS at 23:13

## 2020-05-08 ASSESSMENT — PAIN SCALES - GENERAL
PAINLEVEL_OUTOF10: 0

## 2020-05-08 ASSESSMENT — LIFESTYLE VARIABLES: SMOKING_STATUS: 1

## 2020-05-08 NOTE — PLAN OF CARE
Problem: Pain:  Goal: Pain level will decrease  Description: Pain level will decrease  Outcome: Met This Shift  Goal: Control of acute pain  Description: Control of acute pain  Outcome: Met This Shift  Goal: Control of chronic pain  Description: Control of chronic pain  Outcome: Met This Shift     Problem: Falls - Risk of:  Goal: Will remain free from falls  Description: Will remain free from falls  Outcome: Met This Shift  Goal: Absence of physical injury  Description: Absence of physical injury  Outcome: Met This Shift     Problem: Fluid Volume:  Goal: Hemodynamic stability will improve  Description: Hemodynamic stability will improve  Outcome: Met This Shift  Goal: Ability to maintain a balanced intake and output will improve  Description: Ability to maintain a balanced intake and output will improve  Outcome: Met This Shift     Problem: Respiratory:  Goal: Respiratory status will improve  Description: Respiratory status will improve  Outcome: Met This Shift

## 2020-05-08 NOTE — PROCEDURES
Blanche Willams is a 48 y.o. male patient. 1. Alcoholic cirrhosis of liver with ascites ([facility] [address]      Past Medical History:   Diagnosis Date    Alcoholism ([facility] [address]     Ascites     Cirrhosis ([facility] [address]     Hypertension     Noncompliance      Blood pressure 139/64, pulse 96, temperature 98 °F (36.7 °C), temperature source Oral, resp. rate 18, height 5' 10\" (1.778 m), weight 278 lb (126.1 kg), SpO2 95 %. Procedures     Procedure:  Esophagogastroduodenoscopy    Indication:  Acute on chronic Anemia, Cirrhosis Hx of varices     Sedation:  MAC    Estimated Blood Loss: 0cc     Endoscope was advanced easily through mouth to second portion of duodenum      Oropharynx views are limited but grossly normal.    Esophagus:   LA Grade D Erosive Esophagitis starting  at 30cm and extending down to the GE junction. No esophageal varices seen. GEJ at 40 cm. Stomach:   Antrum with PHG changes     Gastric body with PHG changes     Retroflexed with PHG changes. No gastric varices present. Duodenum: Bulb is normal.    Second portion of duodenum is normal.    No fresh or old blood was seen         IMPRESSION AND PLAN:     1. LA Grade D Erosive Esophagitis start at 30cm and extending down to the GE junction. Biopsies not taken due to hx of cirrhosis. Will continue on Protonix gtt for total of 72 hrs.     2. Entire stomach with PHG changes continue with Nadolol. 3. No esophageal or gastric varices seen. Will DC Octreotide. 4. Will restart on 2gm NA diet       Pt was seen and procedure was performed with Dr. Iam Allan present for the entire procedure    Danielle Ye DO   GI Fellow   5/8/2020      I was present for entire duration of procedure and participated in key and non-key portions. Discussed findings with the fellow and agree with recommendations above.     Irving Motta DO  5/8/2020  3:45 PM

## 2020-05-08 NOTE — CONSULTS
Pharmacy Consultation Note  (Antibiotic Dosing and Monitoring)    Initial consult date: 2020  Consulting physician: Dr Juan Jose Melton  Drug: Vancomycin  Indication:     Age/  Gender Height Weight IBW Dosing weight  Allergy Information   54 y.o./male 5' 10\" (177.8 cm) 279 lb (126.6 kg)     Ideal body weight: 73 kg (160 lb 15 oz)  Adjusted ideal body weight: 94.4 kg (208 lb 2.6 oz)  126.6  Patient has no known allergies. Temp (24hrs), Av.9 °F (37.2 °C), Min:98.7 °F (37.1 °C), Max:99 °F (37.2 °C)          Date  WBC BUN SCr CrCl  (mL/min) Drug/Dose Time   Given Level(s)   (Time) Comments   2020 9.1 13 0.6 192   Vancomycin 2000 mg IV  (0)                                          No intake or output data in the 24 hours ending 20    Historical Cultures:  No results found for: ORG  No results for input(s): BC in the last 72 hours. Cultures:  available culture and sensitivity results were reviewed in [facility]    Assessment:  · 48 y.o. male has been initiated Vancomycin.   · Estimated CrCl = 192 mL/min  · Goal trough level = 15-20 mcg/mL    Plan:  · Will initiate vancomycin at a dose of 2000 mg  · Monitor renal function   · Clinical pharmacy to follow      Ana Maria Rizvi [address] [place] [address] 2020 9:23 PM

## 2020-05-08 NOTE — PROGRESS NOTES
Date: 5/8/2020 3:39 PM       PT/OT on hold due to transfer to ICU; please reorder PT/OT EVAL AND TREAT when patient able to participate. Thank you.   Electronically signed by Aung Knapp PTA on 5/8/2020 at 3:39 PM

## 2020-05-08 NOTE — PROGRESS NOTES
OCCUPATIONAL THERAPY  Initial Evaluation  Date:2020  Patient Name: Moira Collet  MRN: [id number]  : 1965  ROOM #: [id number]     Referring Provider: DO Nahomy Jackson OT: Didier Alejandra OTR/L [id number]    Placement Recommendation: HHOT    Recommended Adaptive Equipment: none, pt has access to a wheeled walker and cane    [facility]   AM-PAC Daily Activity Inpatient   How much help for putting on and taking off regular lower body clothing?: A Lot  How much help for Bathing?: A Lot  How much help for Toileting?: A Little  How much help for putting on and taking off regular upper body clothing?: A Little  How much help for taking care of personal grooming?: A Little  How much help for eating meals?: None  AM-PAC Inpatient Daily Activity Raw Score: 17  AM-PAC Inpatient ADL T-Scale Score : 37.26  ADL Inpatient CMS 0-100% Score: 50.11  ADL Inpatient CMS G-Code Modifier : CK    Diagnosis:   1. Alcoholic cirrhosis of liver with ascites [facility])      Pertinent Medical History:   Past Medical History:   Diagnosis Date    Alcoholism ([facility] [address]     Ascites     Cirrhosis ([facility] [address]     Hypertension     Noncompliance         Precautions:  falls, Ascites   Pain Scale: Numeric Rate: 4/10 in B LE's; Nursing notified. Social history: with family: parents        Drive: yes  Home architecture: single family home, 1 story, 3 steps to enter with rail, walk in shower. PLOF: independent with BADL and IADL, ambulated with no device   Equipment owned: none but can borrow a wheeled walker and cane   Cognition: oriented x 4; follows 3 step directions.     good  Problem solving skills   good  Memory    good  Sequencing  Communication: intact   Visual perceptual skills: intact     Glasses: yes   Edema: no     Sensation: intact   Hand Dominance:  Left     X  Right     Left Right Comment   Passive range of motion [facility]     Active range of motion [facility]     Muscle Grade 4/5 4/5    /pinch Strength Intact  Intact     Additional Information: Good  and wfl FMC/dexterity noted during ADL tasks Good  and wfl FMC/dexterity noted during ADL tasks      Functional Assessment:   Initial Eval Status  Date: 5/8/2020 Treatment Status  Date: STGs = LTGs  Time frame: 5-7 days   Feeding Independent   Independent    Grooming Minimal Assist   Independent    UB Dressing Minimal Assist   Independent    LB Dressing Moderate Assist   Independent    Bathing Moderate Assist  Independent    Toileting Supervision to stand at commode to  urinate  Independent    Bed Mobility  Supine to sit: Minimal Assist   Scooting:Minimal Assist  Sit to supine: N/T as pt was up in chair    Supine to sit: Independent   Sit to supine: Independent    Functional Transfers Minimal Assist from EOB. Minimal Assist for transfer to and from commode. Independent    Functional Mobility Minimal Assist with IV pole and hand held assist, 90 feet x 2   Modified [place]    Activity Tolerance fair  Good      Balance:   Sitting: good   Standing: fair with IV pole and HHA    Endurance: fair     Comments: Upon arrival to the room the patient was supine. At end of the session, the patient was up in chair. Call light and phone within reach. All lines and tubes intact. Overall patient demonstrated decreased independence and safety during completion of ADL/functional transfer/mobility tasks. Pt would benefit from continued skilled OT to increase safety and independence with completion of ADL/IADL tasks for functional independence and quality of life. Treatment: Therapist facilitated bed mobility. Assistance to don socks. Sitting balance at EOB to increase dynamic sitting balance and activity tolerance.  Transfer training with verbal cues to for hand placement throughout evaluation to improve safety, static standing balance with IV pole to improve balance, functional mobility with IV pole to improve balance, pt returned to room and stood at commode to urinate, supervision to stand at Therapy                  [id number]     Neuro Re-ed                       [id number]     Orthotic manage/training    [id number]     Total Timed Treatment 13 1     Evaluation time includes thorough review of current medical information, gathering information on past medical history/social history and prior level of function, completion of standardized testing/informal observation of tasks, assessment of data, and development of POC/Goals.     Sana Lopez OTR/L [id number]

## 2020-05-08 NOTE — CONSULTS
wheezing. Gastrointestinal: Denies nausea, vomiting, diarrhea, or constipation. Denies any abdominal pain, black or bloody stools. Genitourinary: Denies any urinary urgency, frequency, hematuria. Voiding without difficulty. Endocrine: Denies sensitivity to heat or cold. Denies changes in hair, skin or nails. Denies excessive thirst, hunger or going to the bathroom more frequently than usual.  Extremities: Denies swelling or calf pain. Musculoskeletal: Denies muscle or joint pain, stiffness, arthritis, gout, or instability. Dermatology / Skin: Denies any rashes, ulcers, or excoriations. Denies bruising. Neurology: Denies any bowel or bladder incontinence, headache or focal neurological deficits. No weakness or paresthesia. Denies numbness or tingling in the hands or feet. Psychiatric: Denies nervousness/anxiety, depression or memory loss. Past Medical History:  Past Medical History:   Diagnosis Date    Alcoholism ([facility]     Ascites     Cirrhosis ([facility]     Hypertension     Noncompliance        Past Surgical History:  Past Surgical History:   Procedure Laterality Date    ABDOMEN SURGERY      ABDOMINAL EXPLORATION SURGERY  03/22/2014    Exploration abdominal wall wound, partial omentectomy,  umbilical hernia repair, 5100cc ascitic fluid removed    ENDOSCOPY, COLON, DIAGNOSTIC      PARACENTESIS  2017 5/4-10L; 5/8-10L; 6/15-10L    PARACENTESIS  2014    3/22-5L; 3/26-4L       Home Medications:  Prior to Admission medications    Not on File       Allergies: Patient has no known allergies.     Social History:  Social History     Socioeconomic History    Marital status: Single     Spouse name: Not on file    Number of children: 0    Years of education: 15    Highest education level: Not on file   Occupational History    Not on file   Social Needs    Financial resource strain: Not on file    Food insecurity     Worry: Not on file     Inability: Not on file   creads needs Medical: Not on file     Non-medical: Not on file   Tobacco Use    Smoking status: Current Every Day Smoker     Packs/day: 1.00     Years: 20.00     Pack years: 20.00     Types: Cigars    Smokeless tobacco: Current User     Types: Chew   Substance and Sexual Activity    Alcohol use: Yes     Alcohol/week: 21.0 standard drinks     Types: 21 Glasses of wine per week     Comment: gets 5 liter wine box ehich can last 2 days    Drug use: No    Sexual activity: Never   Lifestyle    Physical activity     Days per week: Not on file     Minutes per session: Not on file    Stress: Not on file   Relationships    Social connections     Talks on phone: Not on file     Gets together: Not on file     Attends Scientologist service: Not on file     Active member of club or organization: Not on file     Attends meetings of clubs or organizations: Not on file     Relationship status: Not on file    Intimate partner violence     Fear of current or ex partner: Not on file     Emotionally abused: Not on file     Physically abused: Not on file     Forced sexual activity: Not on file   Other Topics Concern    Not on file   Social History Narrative    Not on file       Family History:  Family History   Problem Relation Age of Onset    Heart Disease Mother     Diabetes Father     Other Sister     High Blood Pressure Maternal Grandmother     High Blood Pressure Maternal Grandfather          PHYSICAL EXAM:  Vital Signs: BP (!) 118/59   Pulse 115   Temp 99 °F (37.2 °C) (Oral)   Resp 13   Ht 5' 10\" (1.778 m)   Wt 278 lb (126.1 kg)   SpO2 93%   BMI 39.89 kg/m²   GENERAL APPEARANCE:  awake, alert, oriented, cooperative, and in no acute distress  EYES:  Lids and lashes normal, PERRLA, EOMI, sclera clear, conjunctiva normal  HENT:  Normocephalic, without obvious abnormality, atramatic, oral pharynx with moist mucus membranes, tonsils without erythema or exudates  NECK:  Supple with no carotid bruits, JVD or thyromegaly.   No cervical adenopathy  LUNGS:  Clear to auscultation bilaterally with no wheezes, rales or rhonchi. No increased work of breathing, good air exchange. CARDIOVASCULAR: Regular rate and rhythm, no murmur  ABDOMEN: Large fluid wave flank dullness massive ascites present no pain with palpation  MUSCULOSKELETAL:  There is no redness, warmth, or swelling of the joints. Full range of motion noted. Motor strength is 5 out of 5 all extremities bilaterally. Tone is normal.  EXTREMITIES: Chronic venous stasis changes of the bilateral lower extremities  NEUROLOGIC:  Awake, alert, oriented to name, place and time. Cranial nerves II-XII are grossly intact. Motor is 5 out of 5 bilaterally. SKIN: Normal skin color, texture, and turgor. There is no redness, warmth, or swelling. No bruising or bleeding, no mottling. PSYCH: Affect, behavior and insight are all within normal limits.       DATA:  Results for orders placed or performed during the hospital encounter of 05/07/20   CBC Auto Differential   Result Value Ref Range    WBC 9.1 4.5 - 11.5 E9/L    RBC 3.19 (L) 3.80 - 5.80 E12/L    Hemoglobin 12.4 (L) 12.5 - 16.5 g/dL    Hematocrit 34.5 (L) 37.0 - 54.0 %    .2 (H) 80.0 - 99.9 fL    MCH 38.9 (H) 26.0 - 35.0 pg    MCHC 35.9 (H) 32.0 - 34.5 %    RDW 18.4 (H) 11.5 - 15.0 fL    Platelets 353 720 - 599 E9/L    MPV 11.0 7.0 - 12.0 fL    Neutrophils % 91.3 (H) 43.0 - 80.0 %    Lymphocytes % 6.1 (L) 20.0 - 42.0 %    Monocytes % 1.7 (L) 2.0 - 12.0 %    Eosinophils % 0.1 0.0 - 6.0 %    Basophils % 0.9 0.0 - 2.0 %    Neutrophils Absolute 8.28 (H) 1.80 - 7.30 E9/L    Lymphocytes Absolute 0.55 (L) 1.50 - 4.00 E9/L    Monocytes Absolute 0.18 0.10 - 0.95 E9/L    Eosinophils Absolute 0.00 (L) 0.05 - 0.50 E9/L    Basophils Absolute 0.08 0.00 - 0.20 E9/L    Anisocytosis 2+     Poikilocytes 1+     Target Cells 1+    Basic Metabolic Panel w/ Reflex to MG   Result Value Ref Range    Sodium 126 (L) 132 - 146 mmol/L    Potassium reflex Magnesium 5.6 (H) 3.5 - 5.0 mmol/L    Chloride 83 (L) 98 - 107 mmol/L    CO2 24 22 - 29 mmol/L    Anion Gap 19 (H) 7 - 16 mmol/L    Glucose 116 (H) 74 - 99 mg/dL    BUN 13 6 - 20 mg/dL    CREATININE 0.6 (L) 0.7 - 1.2 mg/dL    GFR Non-African American >60 >=60 mL/min/1.73    GFR African American >60     Calcium 8.8 8.6 - 10.2 mg/dL   Hepatic Function Panel   Result Value Ref Range    Total Protein 8.7 (H) 6.4 - 8.3 g/dL    Alb 3.1 (L) 3.5 - 5.2 g/dL    Alkaline Phosphatase 184 (H) 40 - 129 U/L    ALT 51 (H) 0 - 40 U/L     (H) 0 - 39 U/L    Total Bilirubin 4.8 (H) 0.0 - 1.2 mg/dL    Bilirubin, Direct 1.3 (H) 0.0 - 0.3 mg/dL    Bilirubin, Indirect 3.5 (H) 0.0 - 1.0 mg/dL   Protime-INR   Result Value Ref Range    Protime 16.0 (H) 9.3 - 12.4 sec    INR 1.4    APTT   Result Value Ref Range    aPTT 32.6 24.5 - 35.1 sec   Lactic Acid, Plasma   Result Value Ref Range    Lactic Acid 6.3 (HH) 0.5 - 2.2 mmol/L   Troponin   Result Value Ref Range    Troponin <0.01 0.00 - 0.03 ng/mL   Brain Natriuretic Peptide   Result Value Ref Range    Pro- (H) 0 - 125 pg/mL   Sedimentation Rate   Result Value Ref Range    Sed Rate 100 (H) 0 - 15 mm/Hr   Ammonia   Result Value Ref Range    Ammonia 51.0 16.0 - 60.0 umol/L   Lipase   Result Value Ref Range    Lipase 46 13 - 60 U/L   Serum Drug Screen   Result Value Ref Range    Ethanol Lvl 24 mg/dL    Acetaminophen Level <5.0 (L) 10.0 - 26.8 mcg/mL    Salicylate, Serum <5.5 0.0 - 30.0 mg/dL    TCA Scrn NEGATIVE Cutoff:300 ng/mL   Lactic Acid, Plasma   Result Value Ref Range    Lactic Acid 4.9 (HH) 0.5 - 2.2 mmol/L   Procalcitonin   Result Value Ref Range    Procalcitonin 0.19 (H) 0.00 - 0.08 ng/mL   Ferritin   Result Value Ref Range    Ferritin 2,184 ng/mL   Body fluid cell count with differential   Result Value Ref Range    Cell Count Fluid Type Ascites     Color, Fluid Yellow     Appearance, Fluid Clear     Nucl Cell, Fluid 18 /uL    RBC, Fluid <2,000 /uL    Neutrophil Count, Fluid 11 %    Monocyte Count, Fluid 89 %   Amylase, body fluid   Result Value Ref Range    Amylase, Fluid 7 Not Established U/L   Albumin, fluid   Result Value Ref Range    Albumin, Fluid 0.8 Not Established g/dL    Fluid Type Ascites    Protein, body fluid   Result Value Ref Range    Protein, Fluid 1.4 Not Established g/dL   Lactate dehydrogenase, body fluid   Result Value Ref Range    LD, Fluid 60 Not Established U/L   Hemoglobin A1C   Result Value Ref Range    Hemoglobin A1C 5.1 4.0 - 5.6 %   Lipid Panel   Result Value Ref Range    Cholesterol, Total 100 0 - 199 mg/dL    Triglycerides 122 0 - 149 mg/dL    HDL 7 >40 mg/dL    LDL Calculated 69 0 - 99 mg/dL    VLDL Cholesterol Calculated 24 mg/dL   Magnesium   Result Value Ref Range    Magnesium 1.7 1.6 - 2.6 mg/dL   Phosphorus   Result Value Ref Range    Phosphorus 2.5 2.5 - 4.5 mg/dL   Hemoglobin and Hematocrit, Blood   Result Value Ref Range    Hemoglobin 10.8 (L) 12.5 - 16.5 g/dL    Hematocrit 29.4 (L) 37.0 - 54.0 %   Protime-INR   Result Value Ref Range    Protime 19.5 (H) 9.3 - 12.4 sec    INR 1.7    Lactic Acid, Plasma   Result Value Ref Range    Lactic Acid 3.7 (H) 0.5 - 2.2 mmol/L   Lactic Acid, Plasma   Result Value Ref Range    Lactic Acid 2.5 (H) 0.5 - 2.2 mmol/L   Ammonia   Result Value Ref Range    Ammonia 80.0 (H) 16.0 - 60.0 umol/L   CBC   Result Value Ref Range    WBC 7.1 4.5 - 11.5 E9/L    RBC 2.63 (L) 3.80 - 5.80 E12/L    Hemoglobin 10.2 (L) 12.5 - 16.5 g/dL    Hematocrit 28.1 (L) 37.0 - 54.0 %    .8 (H) 80.0 - 99.9 fL    MCH 38.8 (H) 26.0 - 35.0 pg    MCHC 36.3 (H) 32.0 - 34.5 %    RDW 18.1 (H) 11.5 - 15.0 fL    Platelets 95 (L) 754 - 450 E9/L    MPV 10.4 7.0 - 12.0 fL   Comprehensive Metabolic Panel   Result Value Ref Range    Sodium 127 (L) 132 - 146 mmol/L    Potassium 4.0 3.5 - 5.0 mmol/L    Chloride 87 (L) 98 - 107 mmol/L    CO2 28 22 - 29 mmol/L    Anion Gap 12 7 - 16 mmol/L    Glucose 106 (H) 74 - 99 mg/dL    BUN 13 6 - 20 mg/dL CREATININE 0.5 (L) 0.7 - 1.2 mg/dL    GFR Non-African American >60 >=60 mL/min/1.73    GFR African American >60     Calcium 8.5 (L) 8.6 - 10.2 mg/dL    Total Protein 7.1 6.4 - 8.3 g/dL    Alb 2.8 (L) 3.5 - 5.2 g/dL    Total Bilirubin 5.4 (H) 0.0 - 1.2 mg/dL    Alkaline Phosphatase 143 (H) 40 - 129 U/L    ALT 39 0 - 40 U/L     (H) 0 - 39 U/L   TSH without Reflex   Result Value Ref Range    TSH 1.850 0.270 - 4.200 uIU/mL   Osmolality, Urine   Result Value Ref Range    Osmolality, Ur 724 300 - 900 mOsm/kg   OSMOLALITY, SERUM   Result Value Ref Range    Osmolality 277 (L) 285 - 310 mOsm/Kg   Creatinine, Random Urine   Result Value Ref Range    Creatinine, Ur 212 40 - 278 mg/dL   Sodium, urine, random   Result Value Ref Range    Sodium, Ur <20 Not Established mmol/L   Uric acid   Result Value Ref Range    Uric Acid, Serum 6.5 3.4 - 7.0 mg/dL   Cortisol   Result Value Ref Range    Cortisol 13.84 2.68 - 18.40 mcg/dL   Basic Metabolic Panel   Result Value Ref Range    Sodium 130 (L) 132 - 146 mmol/L    Potassium 4.1 3.5 - 5.0 mmol/L    Chloride 89 (L) 98 - 107 mmol/L    CO2 27 22 - 29 mmol/L    Anion Gap 14 7 - 16 mmol/L    Glucose 115 (H) 74 - 99 mg/dL    BUN 13 6 - 20 mg/dL    CREATININE 0.5 (L) 0.7 - 1.2 mg/dL    GFR Non-African American >60 >=60 mL/min/1.73    GFR African American >60     Calcium 8.6 8.6 - 10.2 mg/dL   Platelet Confirmation   Result Value Ref Range    Platelet Confirmation CONFIRMED    EKG 12 Lead   Result Value Ref Range    Ventricular Rate 113 BPM    Atrial Rate 113 BPM    P-R Interval 148 ms    QRS Duration 96 ms    Q-T Interval 344 ms    QTc Calculation (Bazett) 471 ms    P Axis 56 degrees    R Axis 19 degrees    T Axis 0 degrees   TYPE AND SCREEN   Result Value Ref Range    ABO/Rh A POS     Antibody Screen NEG          IMAGING:  Xr Chest Standard (2 Vw)    Result Date: 5/7/2020  EXAMINATION: ONE SUPINE XRAY VIEW(S) OF THE ABDOMEN; TWO XRAY VIEWS OF THE CHEST 5/7/2020 8:18 pm COMPARISON: None. HISTORY: ORDERING SYSTEM PROVIDED HISTORY: distention lactic acidosis TECHNOLOGIST PROVIDED HISTORY: Reason for exam:->distention lactic acidosis FINDINGS: Chest-two views: There is free intraperitoneal air. The mediastinal and cardiac contours are normal.  The lungs are clear. There is no pleural effusion or pneumothorax identified. Abdomen: There are multiple abnormally dilated loops of small bowel within the left mid abdomen. There is a paucity of air-filled distal colon. 1. Free intraperitoneal air. 2. Multiple dilated loops of small bowel within the mid abdomen concerning for a bowel obstruction or an ileus. 3. No acute cardiopulmonary process identified. The above findings were discussed with [place] at 8:40 p.m. on 05/07/2020. Xr Abdomen (kub) (single Ap View)    Result Date: 5/7/2020  EXAMINATION: ONE SUPINE XRAY VIEW(S) OF THE ABDOMEN; TWO XRAY VIEWS OF THE CHEST 5/7/2020 8:18 pm COMPARISON: None. HISTORY: ORDERING SYSTEM PROVIDED HISTORY: distention lactic acidosis TECHNOLOGIST PROVIDED HISTORY: Reason for exam:->distention lactic acidosis FINDINGS: Chest-two views: There is free intraperitoneal air. The mediastinal and cardiac contours are normal.  The lungs are clear. There is no pleural effusion or pneumothorax identified. Abdomen: There are multiple abnormally dilated loops of small bowel within the left mid abdomen. There is a paucity of air-filled distal colon. 1. Free intraperitoneal air. 2. Multiple dilated loops of small bowel within the mid abdomen concerning for a bowel obstruction or an ileus. 3. No acute cardiopulmonary process identified. The above findings were discussed with Serjuarez at 8:40 p.m. on 05/07/2020.      Ct Abdomen Pelvis W Iv Contrast Additional Contrast? None    Result Date: 5/8/2020  EXAMINATION: CT OF THE ABDOMEN AND PELVIS WITH CONTRAST 5/7/2020 10:12 pm TECHNIQUE: CT of the abdomen and pelvis was performed with the administration of intravenous contrast. Multiplanar reformatted images are provided for review. Dose modulation, iterative reconstruction, and/or weight based adjustment of the mA/kV was utilized to reduce the radiation dose to as low as reasonably achievable. COMPARISON: CT abdomen and pelvis 12/24/2006 HISTORY: ORDERING SYSTEM PROVIDED HISTORY: Free air post paracentesis, please call GI fellow if cannot fit in scanner TECHNOLOGIST PROVIDED HISTORY: Reason for exam:->Free air post paracentesis, please call GI fellow if cannot fit in scanner Additional Contrast?->None FINDINGS: LOWER CHEST Lung bases: Focal ground-glass abnormality in the middle lobe along the major fissure, probably outside the field of view on 2006 comparison. This measures 4.4 x 1.9 cm in axial plane. Normal included heart and pericardium. ABDOMEN Liver: Shrunken nodular liver, diffusely low in attenuation suggesting underlying infiltrative disease such as steatosis. A low-attenuation cystic focus has increased in size since 2006, now measuring 3.8 cm in greatest axial dimension. Gallbladder: Grossly unremarkable, not well evaluated. Biliary: No intra or extrahepatic bile duct dilatation. Pancreas: Normal. Spleen: Normal. Adrenals: Normal. Kidneys: Exophytic right renal simple cysts have increased in size. Kidneys are otherwise symmetric in size and parenchymal enhancement. A too small to characterize exophytic focus in the left mid polar region is noted. No hydronephrosis or nephrolithiasis. GI Tract: Normal distal esophagus, stomach and duodenal sweep. No apparent bowel wall thickening or obstruction. Peritoneum/Retroperitoneum: Large volume abdominal ascites with mesenteric edema and shotty reactive lymphadenopathy. Vascular: Normal caliber abdominal aorta and IVC. Portosystemic collaterals appreciated, including recanalized paraumbilical vein and prominent paraesophageal varices. PELVIS Genitourinary: Normal urinary bladder. Normal reproductive organs. Other: No free fluid. No enlarged lymph nodes. MUSCULOSKELETAL Bones and Soft Tissues: Left inguinal canal contains ascitic fluid. Diffuse body wall anasarca. Portosystemic collaterals along the ventral abdomen. No acute osseous abnormality. T11 superior endplate wedge deformity appears chronic. No evidence of pneumoperitoneum following paracentesis. Large volume of abdominopelvic ascites remains. Cirrhosis with features of portal venous hypertension, including prominent portosystemic collaterals. A hepatic simple cyst has increased in size since 2006. New bubbly ground-glass opacity in the middle lobe along the fissure, outside the field of view on comparison exam.  This is indeterminate. Although this could represent focal infection/inflammatory focus, additional considerations would include organizing pneumonia or adenocarcinoma spectrum lesion. Recommend follow-up CT chest in 3 months. Us Abdomen Limited    Result Date: 5/7/2020  EXAMINATION: RIGHT UPPER QUADRANT ULTRASOUND 5/7/2020 10:20 pm COMPARISON: CT abdomen pelvis 05/07/2020 HISTORY: ORDERING SYSTEM PROVIDED HISTORY: Decompensated cirrhosis, r/o hepatoma TECHNOLOGIST PROVIDED HISTORY: Reason for exam:->Decompensated cirrhosis, r/o hepatoma FINDINGS: LIVER:  There is diffusely abnormal increased echotexture throughout the liver consistent with hepatocellular disease. Decreased through transmission of sound challenge evaluation of the hepatic parenchyma. There is a focal hypoattenuating lesion identified left hepatic lobe posteriorly with slight posterior acoustic enhancement suggestive of a cyst. BILIARY SYSTEM:  Gallbladder is not seen/visualized sonographically. RIGHT KIDNEY: No hydronephrosis PANCREAS:  Not visualized. OTHER: There is large amount ascites identified right upper quadrant, right lower quadrant and left lower quadrant. Large volume ascites.  Cirrhotic appearance of the liver with decreased through transmission of sound challenge evaluation for hepatic lesions. Nonvisualization of the gallbladder and pancreas due to body habitus and decreased through transmission of sound. ASSESSMENT/PLAN:      1. Acutely Decompensated Cirrhosis/ Alcoholic Hepatitis   - last ETOH ingestion 5/7   - AST/ALT elevated in typical 2:1 ration   - Maddrey discriminant score 38.1 consider addition of Trental, prednisolone contraindicative till  infection is ruled out   -Patient counseled the need to refrain from continued alcohol abuse and the risk of death associated with it  - RUQ negative for mass, AFP is pending for [facility] surveillance   - Grade 0-1 HE on exam start on Lactulose 20mg BID titrate to 2-3 soft bowel movements daily      2. Massive Ascites   - Repeat paracentesis this >10L taken off will give 50g albuminx1   - SAAG and protein level consistent with portal HTN as cause of ascites   - Protein level of ascitic fluid <1.5  Pt would benefit from prophylactic abx for SBP   -PMNs <250   - Start pts on Aldactone 50gm BID   - Pt counseled about 2gm NA diet and need from compliance     3. Acute on Chronic Macrocytic Anemia   - No overt GI bleed on exam   - Hgb 10.6 at baseline   - B12 folate iron studies and TIBC ordered   - Continue with Octreotide gtt  - Last EGD 5/8/2017 4 small column esophageal variceal not amenable to banding   - Continue with Nadolol titrate to HR of 80   - Pt lost to follow up, will plan on EGD this afternoon to evaluate     4. Hyponatremia   - per admitting   - maybe due to delusional effect with massive ascites  - trend with daily CMP         Please see orders for further plan of care. Reviewed above with Dr. Nadir Hernandez D.O.   GI fellow  5/8/2020 at 9:11 AM    Pt seen and independently examined. Pertinent notes and lab work reviewed. Monitor reviewed showing sinus rhythm. D/w Dr. Lauren Palomino. Agree with physical exam and A&P.   Discussed with patient/ - all questions answered - agreeable with the

## 2020-05-08 NOTE — CONSULTS
GENERAL SURGERY  CONSULT NOTE  5/8/2020    Physician Consulted: Dr. Javier Breen  Reason for Consult: Free air  Referring Physician: Dr. Janine Egan    HPI  Анна Barclay is a 48 y.o. male who presents for evaluation of abdominal distension for several weeks. Denied abdominal pain, nausea/vomiting, hematemesis, or melena. Hx of EtOH abuse with cirrhosis. Underwent paracentesis in the ED with 5.5 L of ascites removed. Concerns for free intraperitoneal air on acute abdominal series. No evidence of pneumoperitoneum seen on CT A/P. Last bowel movement was five days ago. Reduced appetite. Past Medical History:   Diagnosis Date    Alcoholism ([address]     Ascites     Cirrhosis ([address]     Hypertension     Noncompliance        Past Surgical History:   Procedure Laterality Date    ABDOMEN SURGERY      ABDOMINAL EXPLORATION SURGERY  03/22/2014    Exploration abdominal wall wound, partial omentectomy,  umbilical hernia repair, 5100cc ascitic fluid removed    ENDOSCOPY, COLON, DIAGNOSTIC      PARACENTESIS  2017 5/4-10L; 5/8-10L; 6/15-10L    PARACENTESIS  2014    3/22-5L; 3/26-4L       Medications Prior to Admission:    Prior to Admission medications    Not on File       No Known Allergies    Family History   Problem Relation Age of Onset    Heart Disease Mother     Diabetes Father     Other Sister     High Blood Pressure Maternal Grandmother     High Blood Pressure Maternal Grandfather        Social History     Tobacco Use    Smoking status: Current Every Day Smoker     Packs/day: 1.00     Years: 20.00     Pack years: 20.00     Types: Cigars    Smokeless tobacco: Current User     Types: Chew   Substance Use Topics    Alcohol use:  Yes     Alcohol/week: 21.0 standard drinks     Types: 21 Glasses of wine per week    Drug use: No         Review of Systems   General ROS: negative for - chills, fatigue or fever  Hematological and Lymphatic ROS: negative for - fatigue, jaundice or night sweats  Respiratory ROS: no cough, shortness of breath, or wheezing  Cardiovascular ROS: no chest pain or dyspnea on exertion  Gastrointestinal ROS: positive for - appetite loss, constipation and distension  Genito-Urinary ROS: no dysuria, trouble voiding, or hematuria  Musculoskeletal ROS: negative for - joint swelling, muscle pain or muscular weakness      PHYSICAL EXAM:    Vitals:    05/08/20 0154   BP:    Pulse: 115   Resp:    Temp:    SpO2:        General: NAD, awake and alert. Head: Normocephalic, atraumatic  Eyes: PERRLA, EOMI. No sclera icterus. Lungs: No increased work of breathing. Cardiovascular: RRR. Abdomen: Soft, distended, NT. Fluid wave. No rebound, guarding or rigidity. Extremities: Atraumatic, full range of motion  Skin: Warm, dry and intact        LABS:  CBC  Recent Labs     05/08/20  0606   WBC 7.1   HGB 10.2*   HCT 28.1*   PLT 95*     BMP  Recent Labs     05/08/20  0004   *   K 4.1   CL 89*   CO2 27   BUN 13   CREATININE 0.5*   CALCIUM 8.6     Liver Function  Recent Labs     05/07/20  1517   LIPASE 46   BILITOT 4.8*   BILIDIR 1.3*   *   ALT 51*   ALKPHOS 184*   PROT 8.7*   LABALBU 3.1*     No results for input(s): LACTATE in the last 72 hours. Recent Labs     05/08/20  0606   INR 1.7       RADIOLOGY  Xr Chest Standard (2 Vw)    Result Date: 5/7/2020  EXAMINATION: ONE SUPINE XRAY VIEW(S) OF THE ABDOMEN; TWO XRAY VIEWS OF THE CHEST 5/7/2020 8:18 pm COMPARISON: None. HISTORY: ORDERING SYSTEM PROVIDED HISTORY: distention lactic acidosis TECHNOLOGIST PROVIDED HISTORY: Reason for exam:->distention lactic acidosis FINDINGS: Chest-two views: There is free intraperitoneal air. The mediastinal and cardiac contours are normal.  The lungs are clear. There is no pleural effusion or pneumothorax identified. Abdomen: There are multiple abnormally dilated loops of small bowel within the left mid abdomen. There is a paucity of air-filled distal colon. 1. Free intraperitoneal air.  2. Multiple dilated loops of small bowel within the mid abdomen concerning for a bowel obstruction or an ileus. 3. No acute cardiopulmonary process identified. The above findings were discussed with Serjuarez at 8:40 p.m. on 05/07/2020. Xr Abdomen (kub) (single Ap View)    Result Date: 5/7/2020  EXAMINATION: ONE SUPINE XRAY VIEW(S) OF THE ABDOMEN; TWO XRAY VIEWS OF THE CHEST 5/7/2020 8:18 pm COMPARISON: None. HISTORY: ORDERING SYSTEM PROVIDED HISTORY: distention lactic acidosis TECHNOLOGIST PROVIDED HISTORY: Reason for exam:->distention lactic acidosis FINDINGS: Chest-two views: There is free intraperitoneal air. The mediastinal and cardiac contours are normal.  The lungs are clear. There is no pleural effusion or pneumothorax identified. Abdomen: There are multiple abnormally dilated loops of small bowel within the left mid abdomen. There is a paucity of air-filled distal colon. 1. Free intraperitoneal air. 2. Multiple dilated loops of small bowel within the mid abdomen concerning for a bowel obstruction or an ileus. 3. No acute cardiopulmonary process identified. The above findings were discussed with Harlan at 8:40 p.m. on 05/07/2020. Ct Abdomen Pelvis W Iv Contrast Additional Contrast? None    Result Date: 5/8/2020  EXAMINATION: CT OF THE ABDOMEN AND PELVIS WITH CONTRAST 5/7/2020 10:12 pm TECHNIQUE: CT of the abdomen and pelvis was performed with the administration of intravenous contrast. Multiplanar reformatted images are provided for review. Dose modulation, iterative reconstruction, and/or weight based adjustment of the mA/kV was utilized to reduce the radiation dose to as low as reasonably achievable.  COMPARISON: CT abdomen and pelvis 12/24/2006 HISTORY: ORDERING SYSTEM PROVIDED HISTORY: Free air post paracentesis, please call GI fellow if cannot fit in scanner TECHNOLOGIST PROVIDED HISTORY: Reason for exam:->Free air post paracentesis, please call GI fellow if cannot fit in scanner Additional Contrast?->None FINDINGS: LOWER CHEST Lung bases: Focal ground-glass abnormality in the middle lobe along the major fissure, probably outside the field of view on 2006 comparison. This measures 4.4 x 1.9 cm in axial plane. Normal included heart and pericardium. ABDOMEN Liver: Shrunken nodular liver, diffusely low in attenuation suggesting underlying infiltrative disease such as steatosis. A low-attenuation cystic focus has increased in size since 2006, now measuring 3.8 cm in greatest axial dimension. Gallbladder: Grossly unremarkable, not well evaluated. Biliary: No intra or extrahepatic bile duct dilatation. Pancreas: Normal. Spleen: Normal. Adrenals: Normal. Kidneys: Exophytic right renal simple cysts have increased in size. Kidneys are otherwise symmetric in size and parenchymal enhancement. A too small to characterize exophytic focus in the left mid polar region is noted. No hydronephrosis or nephrolithiasis. GI Tract: Normal distal esophagus, stomach and duodenal sweep. No apparent bowel wall thickening or obstruction. Peritoneum/Retroperitoneum: Large volume abdominal ascites with mesenteric edema and shotty reactive lymphadenopathy. Vascular: Normal caliber abdominal aorta and IVC. Portosystemic collaterals appreciated, including recanalized paraumbilical vein and prominent paraesophageal varices. PELVIS Genitourinary: Normal urinary bladder. Normal reproductive organs. Other: No free fluid. No enlarged lymph nodes. MUSCULOSKELETAL Bones and Soft Tissues: Left inguinal canal contains ascitic fluid. Diffuse body wall anasarca. Portosystemic collaterals along the ventral abdomen. No acute osseous abnormality. T11 superior endplate wedge deformity appears chronic. No evidence of pneumoperitoneum following paracentesis. Large volume of abdominopelvic ascites remains. Cirrhosis with features of portal venous hypertension, including prominent portosystemic collaterals.   A hepatic simple cyst has increased in size since 2006. New bubbly ground-glass opacity in the middle lobe along the fissure, outside the field of view on comparison exam.  This is indeterminate. Although this could represent focal infection/inflammatory focus, additional considerations would include organizing pneumonia or adenocarcinoma spectrum lesion. Recommend follow-up CT chest in 3 months. Us Abdomen Limited    Result Date: 5/7/2020  EXAMINATION: RIGHT UPPER QUADRANT ULTRASOUND 5/7/2020 10:20 pm COMPARISON: CT abdomen pelvis 05/07/2020 HISTORY: ORDERING SYSTEM PROVIDED HISTORY: Decompensated cirrhosis, r/o hepatoma TECHNOLOGIST PROVIDED HISTORY: Reason for exam:->Decompensated cirrhosis, r/o hepatoma FINDINGS: LIVER:  There is diffusely abnormal increased echotexture throughout the liver consistent with hepatocellular disease. Decreased through transmission of sound challenge evaluation of the hepatic parenchyma. There is a focal hypoattenuating lesion identified left hepatic lobe posteriorly with slight posterior acoustic enhancement suggestive of a cyst. BILIARY SYSTEM:  Gallbladder is not seen/visualized sonographically. RIGHT KIDNEY: No hydronephrosis PANCREAS:  Not visualized. OTHER: There is large amount ascites identified right upper quadrant, right lower quadrant and left lower quadrant. Large volume ascites. Cirrhotic appearance of the liver with decreased through transmission of sound challenge evaluation for hepatic lesions. Nonvisualization of the gallbladder and pancreas due to body habitus and decreased through transmission of sound. ASSESSMENT:  48 y.o. male with ascites from cirrhosis    PLAN:  No free intraperitoneal air seen on CT A/P.  Abdomen distended but not peritoneal.  Low sodium diet as tolerated  GI following would defer cirrhosis management  Recommend addition of PO lactulose for constipation and hyperammonemia   Ascites - IR consulted for drainage    Electronically signed by Timmy Vanegas MD on 5/8/20 at 6:57 AM EDT    Surgery Progress Note            Chief complaint:   Patient Active Problem List   Diagnosis    Evisceration of bowel    Alcoholic cirrhosis ([address]    Ascites    Hyponatremia       S: as above    O:   Vitals:    05/08/20 0154   BP:    Pulse: 115   Resp:    Temp:    SpO2:        Intake/Output Summary (Last 24 hours) at 5/8/2020 0834  Last data filed at 5/8/2020 3259  Gross per 24 hour   Intake 630.82 ml   Output 350 ml   Net 280.82 ml           Labs:  Recent Labs     05/07/20  1517 05/08/20  0151 05/08/20  0606   WBC 9.1  --  7.1   HGB 12.4* 10.8* 10.2*   HCT 34.5* 29.4* 28.1*     Lab Results   Component Value Date    CREATININE 0.5 (L) 05/08/2020    BUN 13 05/08/2020     (L) 05/08/2020    K 4.0 05/08/2020    CL 87 (L) 05/08/2020    CO2 28 05/08/2020     Recent Labs     05/07/20  1517   LIPASE 46       Physical exam:   BP (!) 118/59   Pulse 115   Temp 99 °F (37.2 °C) (Oral)   Resp 13   Ht 5' 10\" (1.778 m)   Wt 278 lb (126.1 kg)   SpO2 93%   BMI 39.89 kg/m²   General appearance: NAD  Head: NCAT  Neck: supple, no masses  Lungs: equal chest rise bilateral  Heart: S1S2 present  Abdomen: soft, nontender, severely distended  Skin; no lesions  Gu: no cva tenderness  Extremities: extremities normal, atraumatic, no cyanosis or edema    A:  Question of free air and recent paracentesis    P: ct scan, ok for diet as tolerated, will follow prn    Jeff Marcial MD  5/8/2020

## 2020-05-08 NOTE — PROGRESS NOTES
[facility] Quality Flow/Interdisciplinary Rounds Progress Note        Quality Flow Rounds held on May 8, 2020    Disciplines Attending:  Bedside Nurse, ,  and Nursing Unit Leadership    Wong Neil was admitted on 5/7/2020  2:16 PM    Anticipated Discharge Date:  Expected Discharge Date: 05/11/20    Disposition:    Lázaro Score:  Lázaro Scale Score: 19    Readmission Risk              Risk of Unplanned Readmission:        12           Discussed patient goal for the day, patient clinical progression, and barriers to discharge.   The following Goal(s) of the Day/Commitment(s) have been identified:  Zaynab thomas, From  with parents, Para today,       Jaden Nicholson  May 8, 2020

## 2020-05-08 NOTE — BRIEF OP NOTE
Brief Postoperative Note for Paracentesis    Stephan Records  YOB: 1965  [id number]    Pre-operative Diagnosis: Ascites      Post-operative Diagnosis: Same    Procedure: Ultrasound guided Paracentesis     Anesthesia: 1% Lidocaine     Surgeons/Assistants: AUTUMN GALARZA    Complications: none    Specimens: were not obtained    Ultrasound guided paracentesis performed. 15.3 liters clear yellow fluid obtained from RLQ. Dressing applied. Vital signs were reviewed and were stable after the procedure. Discharged to floor. Albumin IV to be administered on the floor.       Electronically signed by Jed Nicholson on 5/8/2020 at 11:25 AM

## 2020-05-08 NOTE — PROGRESS NOTES
Patient education  Patient educated on role of Physical Therapy, risks of immobility and plan of care, purse lipped breathing and safety       Patient response to education:   Pt verbalized understanding Pt demonstrated skill Pt requires further education in this area   Yes Partial Yes      ASSESSMENT: Patient exhibits decreased strength, balance, coordination impairing functional mobility. Therapist educated and facilitated patient on techniques to increase safety and independence with bed mobility, balance, functional transfers, and functional mobility. Treatment:  Patient practiced and was instructed in the following treatment: Sat edge of bed 10 minutes with Supervision  to increase dynamic sitting balance and activity tolerance. donned socks, ambulated in hallway, stood at commode with minimal assist for balance. Therapeutic Exercises:  ankle pumps and long arc quad  x 10 reps. At end of session, patient in chair with  call light and phone within reach,   all lines and tubes intact, nursing notified. Patient would benefit from continued skilled Physical Therapy to improve functional independence and quality of life. Patient's/ family goals   home        Patient and or family understand(s) diagnosis, prognosis, and plan of care. PLAN:    Physical Therapy care will be provided in accordance with the objectives noted above. Exercises and functional mobility practice will be used as well as appropriate assistive devices or modalities to obtain goals. Patient and family education will also be administered as needed. Frequency of treatments: Patient will be seen    daily  for therapeutic exercise, functional retraining, endurance activities, balance exercises, family and patient education.        Time in  1132  Time out  1152    Total Treatment Time  10 minutes    Evaluation time includes thorough review of current medical information, gathering information on past medical history/social history and prior level of function, completion of standardized testing/informal observation of tasks, assessment of data, and development of Plan of care and goals.      CPT codes:  Low Complexity PT evaluation (51891)  Therapeutic activities (77329)   10 minutes  1 unit(s)    Harjit Gonzalez, PT

## 2020-05-08 NOTE — ANESTHESIA PRE PROCEDURE
DO   40 mg at 05/07/20 2152    And    sodium chloride (PF) 0.9 % injection 10 mL  10 mL Intravenous BID Jeison Knight, DO   10 mL at 05/07/20 2152    nadolol (CORGARD) tablet 20 mg  20 mg Oral Daily Yoselyn Jeffries, DO   Stopped at 05/08/20 0900    guaiFENesin [facility]) extended release tablet 600 mg  600 mg Oral BID Yoselynreyna Jeffries, DO   Stopped at 05/08/20 0900    albuterol (PROVENTIL) nebulizer solution 2.5 mg  2.5 mg Nebulization Q4H PRN Yoselyn Jeffries, DO   2.5 mg at 05/07/20 2331    metronidazole (FLAGYL) 500 mg in NaCl 100 mL IVPB premix  500 mg Intravenous Q8H Lela Walker, DO   Stopped at 05/08/20 0805    vancomycin 2 g in dextrose 5% 500 ml IVPB  2,000 mg Intravenous Q12H Lela Walker,  mL/hr at 05/08/20 1352 2,000 mg at 05/08/20 1352       Allergies:  No Known Allergies    Problem List:    Patient Active Problem List   Diagnosis Code    Evisceration of bowel [id number]    Alcoholic cirrhosis ([facility] C87.39    Ascites R18.8    Hyponatremia E87.1       Past Medical History:        Diagnosis Date    Alcoholism ([facility]     Ascites     Cirrhosis ([facility]     Hypertension     Noncompliance        Past Surgical History:        Procedure Laterality Date    ABDOMEN SURGERY      ABDOMINAL EXPLORATION SURGERY  03/22/2014    Exploration abdominal wall wound, partial omentectomy,  umbilical hernia repair, 5100cc ascitic fluid removed    ENDOSCOPY, COLON, DIAGNOSTIC      PARACENTESIS  2017 5/4-10L; 5/8-10L; 6/15-10L    PARACENTESIS  2014    3/22-5L; 3/26-4L       Social History:    Social History     Tobacco Use    Smoking status: Current Every Day Smoker     Packs/day: 1.00     Years: 20.00     Pack years: 20.00     Types: Cigars    Smokeless tobacco: Current User     Types: Chew   Substance Use Topics    Alcohol use:  Yes     Alcohol/week: 21.0 standard drinks     Types: 21 Glasses of wine per week     Comment: gets 5 liter wine box ehich can last 2 days distance: >3 FB   Neck ROM: full  Mouth opening: > = 3 FB Dental: normal exam         Pulmonary:Negative Pulmonary ROS breath sounds clear to auscultation  (+) current smoker                           Cardiovascular:    (+) hypertension:,         Rhythm: regular  Rate: normal                    Neuro/Psych:   (+) psychiatric history:            GI/Hepatic/Renal:   (+) liver disease:,           Endo/Other: Negative Endo/Other ROS                    Abdominal:   (+) obese,     Abdomen: soft. Vascular:                                        Anesthesia Plan      MAC     ASA 4     (Had paracentesis yesterday and today, breathing improved. Alcohol abuse.  )  Induction: intravenous. Anesthetic plan and risks discussed with patient. Plan discussed with CRNA.                   Brit Jimenez MD   5/8/2020

## 2020-05-08 NOTE — PROGRESS NOTES
Internal Medicine Progress Note    Marylene Pass. Ras Lima., & [address] [facility] [address] Ras Lima., F.A.C.O.I. Javier James D.O., F.A.C.O.I. Primary Care Physician: No primary care provider on file. Admitting Physician:  Cherri Dawn DO  Admission date and time: 5/7/2020  2:16 PM    Room:  [address] [place] [address]  Admitting diagnosis: Ascites [R18.8]    Patient Name: Carolina Luciano  MRN: [id number]    Date of Service: 5/8/2020     Subjective:    Jimy Prakash is a 48 y. o.  male who was seen and examined today,5/8/2020, at the bedside. For paracentesis today. Had approximately 5.5 liters removed yesterday. Admits to continued alcohol use-drinks a 5 liter box of wine over 2 days. Feels bloated. cAdmits to abdominal and leg pain. Walking makes his lower extremities burn. Poor appetite. No family present during my examination. Review of System:   Constitutional:   Denies fever or chills, weight loss or gain, fatigue or malaise. HEENT:   Denies ear pain, sore throat, sinus or eye problems. Cardiovascular:   Denies any chest pain, irregular heartbeats, or palpitations. Respiratory:   Denies shortness of breath, coughing, sputum production, hemoptysis, or wheezing. Gastrointestinal:   Denies nausea, vomiting, diarrhea, or constipation. Denies any abdominal pain. Genitourinary:    Denies any urgency, frequency, hematuria. Voiding  without difficulty. Extremities:   Denies lower extremity swelling, edema or cyanosis. Neurology:    Denies any headache or focal neurological deficits, Denies generalized weakness or memory difficulty. Psch:   Denies being anxious or depressed. Musculoskeletal:    Denies  myalgias, joint complaints or back pain. Integumentary:   Denies any rashes, ulcers, or excoriations. Denies bruising. Hematologic/Lymphatic:  Denies bruising or bleeding. Physical Exam:  No intake/output data recorded.     Intake/Output Summary (Last 24 hours) at 5/8/2020 3097  Last data filed at 5/8/2020 8978  Gross per 24 hour   Intake 630.82 ml   Output 350 ml   Net 280.82 ml   I/O last 3 completed shifts: In: 630.8 [IV Piggyback:630.8]  Out: 350 [Urine:350]  Patient Vitals for the past 96 hrs (Last 3 readings):   Weight   05/08/20 0611 278 lb (126.1 kg)   05/07/20 2111 279 lb (126.6 kg)       Vital Signs:   Blood pressure (!) 118/59, pulse 115, temperature 99 °F (37.2 °C), temperature source Oral, resp. rate 13, height 5' 10\" (1.778 m), weight 278 lb (126.1 kg), SpO2 93 %. Essie Rodriguez is a 48 y. o.  male who is alert, responsive, oriented to person, place, and time. General appearance:   Well preserved, alert, no distress. Head:  Normocephalic. No masses, lesions or tenderness. Eyes:  PERRLA. EOMI. Sclera clear. Buccal mucosa moist.  ENT:  Ears normal. Mucosa normal.  Neck:    Supple. Trachea midline. No thyromegaly. No JVD. No bruits. Heart:    Rhythm regular. Rate controlled. No murmurs. Lungs:    Symmetrical. Clear to auscultation bilaterally. No wheezes. No rhonchi. No rales. Abdomen:   Soft. Non-tender. Non-distended. Bowel sounds positive. No organomegaly or masses. Massive distention--ascities. Extremities:    Peripheral pulses present. No peripheral edema. No ulcers. No cyanosis. No clubbing. Neurologic:    Alert x 3. No focal deficit. Cranial nerves grossly intact. No focal weakness. Psych:   Behavior is normal. Mood appears normal. Speech is not rapid and/or pressured. Musculoskeletal:   Spine ROM normal. Muscular strength intact. Gait not assessed. Integumentary:  No rashes  Skin normal color and texture.   Genitalia/Breast:  Deferred      Allergy:  No Known Allergies     Medication:  Scheduled Meds:   lactulose  20 g Oral TID    sodium chloride flush  10 mL Intravenous 2 times per day    cefTRIAXone (ROCEPHIN) IV  1 g Intravenous O94K    folic acid  1 mg Oral Daily    vitamin B-1  100 mg Oral Daily    pantoprazole  40 mg Intravenous BID mediastinal and cardiac contours are normal.  The lungs are clear. There is no pleural effusion or pneumothorax identified. Abdomen: There are multiple abnormally dilated loops of small bowel within the left mid abdomen. There is a paucity of air-filled distal colon. 1. Free intraperitoneal air. 2. Multiple dilated loops of small bowel within the mid abdomen concerning for a bowel obstruction or an ileus. 3. No acute cardiopulmonary process identified. The above findings were discussed with Dairl Angelucci at 8:40 p.m. on 05/07/2020. Xr Abdomen (kub) (single Ap View)    Result Date: 5/7/2020  EXAMINATION: ONE SUPINE XRAY VIEW(S) OF THE ABDOMEN; TWO XRAY VIEWS OF THE CHEST 5/7/2020 8:18 pm COMPARISON: None. HISTORY: ORDERING SYSTEM PROVIDED HISTORY: distention lactic acidosis TECHNOLOGIST PROVIDED HISTORY: Reason for exam:->distention lactic acidosis FINDINGS: Chest-two views: There is free intraperitoneal air. The mediastinal and cardiac contours are normal.  The lungs are clear. There is no pleural effusion or pneumothorax identified. Abdomen: There are multiple abnormally dilated loops of small bowel within the left mid abdomen. There is a paucity of air-filled distal colon. 1. Free intraperitoneal air. 2. Multiple dilated loops of small bowel within the mid abdomen concerning for a bowel obstruction or an ileus. 3. No acute cardiopulmonary process identified. The above findings were discussed with Dairl Angelucci at 8:40 p.m. on 05/07/2020. Ct Abdomen Pelvis W Iv Contrast Additional Contrast? None    Result Date: 5/8/2020  EXAMINATION: CT OF THE ABDOMEN AND PELVIS WITH CONTRAST 5/7/2020 10:12 pm TECHNIQUE: CT of the abdomen and pelvis was performed with the administration of intravenous contrast. Multiplanar reformatted images are provided for review.  Dose modulation, iterative reconstruction, and/or weight based adjustment of the mA/kV was utilized to reduce the radiation dose to as low as reasonably achievable. COMPARISON: CT abdomen and pelvis 12/24/2006 HISTORY: ORDERING SYSTEM PROVIDED HISTORY: Free air post paracentesis, please call GI fellow if cannot fit in scanner TECHNOLOGIST PROVIDED HISTORY: Reason for exam:->Free air post paracentesis, please call GI fellow if cannot fit in scanner Additional Contrast?->None FINDINGS: LOWER CHEST Lung bases: Focal ground-glass abnormality in the middle lobe along the major fissure, probably outside the field of view on 2006 comparison. This measures 4.4 x 1.9 cm in axial plane. Normal included heart and pericardium. ABDOMEN Liver: Shrunken nodular liver, diffusely low in attenuation suggesting underlying infiltrative disease such as steatosis. A low-attenuation cystic focus has increased in size since 2006, now measuring 3.8 cm in greatest axial dimension. Gallbladder: Grossly unremarkable, not well evaluated. Biliary: No intra or extrahepatic bile duct dilatation. Pancreas: Normal. Spleen: Normal. Adrenals: Normal. Kidneys: Exophytic right renal simple cysts have increased in size. Kidneys are otherwise symmetric in size and parenchymal enhancement. A too small to characterize exophytic focus in the left mid polar region is noted. No hydronephrosis or nephrolithiasis. GI Tract: Normal distal esophagus, stomach and duodenal sweep. No apparent bowel wall thickening or obstruction. Peritoneum/Retroperitoneum: Large volume abdominal ascites with mesenteric edema and shotty reactive lymphadenopathy. Vascular: Normal caliber abdominal aorta and IVC. Portosystemic collaterals appreciated, including recanalized paraumbilical vein and prominent paraesophageal varices. PELVIS Genitourinary: Normal urinary bladder. Normal reproductive organs. Other: No free fluid. No enlarged lymph nodes. MUSCULOSKELETAL Bones and Soft Tissues: Left inguinal canal contains ascitic fluid. Diffuse body wall anasarca. Portosystemic collaterals along the ventral abdomen.   No acute osseous abnormality. T11 superior endplate wedge deformity appears chronic. No evidence of pneumoperitoneum following paracentesis. Large volume of abdominopelvic ascites remains. Cirrhosis with features of portal venous hypertension, including prominent portosystemic collaterals. A hepatic simple cyst has increased in size since 2006. New bubbly ground-glass opacity in the middle lobe along the fissure, outside the field of view on comparison exam.  This is indeterminate. Although this could represent focal infection/inflammatory focus, additional considerations would include organizing pneumonia or adenocarcinoma spectrum lesion. Recommend follow-up CT chest in 3 months. Us Abdomen Limited    Result Date: 5/7/2020  EXAMINATION: RIGHT UPPER QUADRANT ULTRASOUND 5/7/2020 10:20 pm COMPARISON: CT abdomen pelvis 05/07/2020 HISTORY: ORDERING SYSTEM PROVIDED HISTORY: Decompensated cirrhosis, r/o hepatoma TECHNOLOGIST PROVIDED HISTORY: Reason for exam:->Decompensated cirrhosis, r/o hepatoma FINDINGS: LIVER:  There is diffusely abnormal increased echotexture throughout the liver consistent with hepatocellular disease. Decreased through transmission of sound challenge evaluation of the hepatic parenchyma. There is a focal hypoattenuating lesion identified left hepatic lobe posteriorly with slight posterior acoustic enhancement suggestive of a cyst. BILIARY SYSTEM:  Gallbladder is not seen/visualized sonographically. RIGHT KIDNEY: No hydronephrosis PANCREAS:  Not visualized. OTHER: There is large amount ascites identified right upper quadrant, right lower quadrant and left lower quadrant. Large volume ascites. Cirrhotic appearance of the liver with decreased through transmission of sound challenge evaluation for hepatic lesions. Nonvisualization of the gallbladder and pancreas due to body habitus and decreased through transmission of sound.               Chronic Hospital Medical Problems:  Past Medical History:   Diagnosis Date    Alcoholism ([facility] [address]     Ascites     Cirrhosis ([facility] [address]     Hypertension     Noncompliance      Active Problems:    Ascites  Resolved Problems:    * No resolved hospital problems. *      Assessment:    · Massive ascites due to severely decompensated alcoholic cirrhosis  · Lactic acidosis--seem improving. · Chronic alcohol abuse  · Macrocytic anemia due to alcohol abuse  · Hyperbilirubinemia  · Hyponatremia  · COPD  · History of varices  · Tobacco abuse  · Noncompliance  · Elevated ammonia    Plan:     · Patient is schedule for echocardiogram today--evaluation right side pressure. · For repeat paracentesis  today--add Aldactone. · Check Ammonia level--add Xifaxin  · GI consult. · Social service consult. · Observe for withdrawal symptoms    I reviewed the patient's past medical, surgical history and medication. Patient's medications were reviewed/continued/adjusted. Labs as ordered. Please see orders for further plan of care. Rhythm strips reviewed as well as consultant recommendations/notes and/or discussion. I reviewed the  course of events since last visit. More than 50% of my  time was spent at the bedside counseling and/or coordination of care with the patient and/or family with face to face contact. This time was spent reviewing notes and laboratory data, instructing and counseling the patient. Time I spent with the family or surrogate(s) is included only if the patient was incapable of providing the necessary information or participating in medical decisionsI also discussed the differential diagnosis and all of the proposed management plans with the patient and individuals accompanying the patient. Tariq Garcia requires this high level of physician care and nursing in the Telemetry due the complexity of decision management and chance of rapid decline or death. Continued cardiac monitoring and higher level of nursing are required.  I am ready available for decision making

## 2020-05-08 NOTE — CARE COORDINATION
5/8/2020 1322 CM note:NO COVID-19 TESTING. CM working remotely from office d/t pandemic. Spoke with pt by phone for transition of care needs. Pt resides with parents, he was [employer] PTA and drives. Pt does not have PCP, [address] [facility] PCP list given to pt. Pt uses [id number] formerly [facility]. PT/OT evals reviewed and pt declines HH need. Peer recovery services offered, pt declined. Plan is home, friend will provide ride.  Shelbie MARINO

## 2020-05-09 LAB
AFP-TUMOR MARKER: 3 NG/ML (ref 0–9)
ALBUMIN SERPL-MCNC: 3.5 G/DL (ref 3.5–5.2)
ALP BLD-CCNC: 105 U/L (ref 40–129)
ALT SERPL-CCNC: 32 U/L (ref 0–40)
AMMONIA: 91 UMOL/L (ref 16–60)
ANION GAP SERPL CALCULATED.3IONS-SCNC: 11 MMOL/L (ref 7–16)
AST SERPL-CCNC: 165 U/L (ref 0–39)
BILIRUB SERPL-MCNC: 5.8 MG/DL (ref 0–1.2)
BUN BLDV-MCNC: 8 MG/DL (ref 6–20)
CALCIUM SERPL-MCNC: 8.6 MG/DL (ref 8.6–10.2)
CHLORIDE BLD-SCNC: 88 MMOL/L (ref 98–107)
CO2: 31 MMOL/L (ref 22–29)
CREAT SERPL-MCNC: 0.4 MG/DL (ref 0.7–1.2)
FOLATE: 3.2 NG/ML (ref 4.8–24.2)
GFR AFRICAN AMERICAN: >60
GFR NON-AFRICAN AMERICAN: >60 ML/MIN/1.73
GLUCOSE BLD-MCNC: 131 MG/DL (ref 74–99)
GRAM STAIN ORDERABLE: NORMAL
HCT VFR BLD CALC: 26.7 % (ref 37–54)
HEMOGLOBIN: 9.4 G/DL (ref 12.5–16.5)
INR BLD: 1.9
MCH RBC QN AUTO: 38.4 PG (ref 26–35)
MCHC RBC AUTO-ENTMCNC: 35.2 % (ref 32–34.5)
MCV RBC AUTO: 109 FL (ref 80–99.9)
PDW BLD-RTO: 18 FL (ref 11.5–15)
PLATELET # BLD: 79 E9/L (ref 130–450)
PLATELET CONFIRMATION: NORMAL
PMV BLD AUTO: 10.6 FL (ref 7–12)
POTASSIUM SERPL-SCNC: 3.5 MMOL/L (ref 3.5–5)
PROTHROMBIN TIME: 22 SEC (ref 9.3–12.4)
RBC # BLD: 2.45 E12/L (ref 3.8–5.8)
SODIUM BLD-SCNC: 130 MMOL/L (ref 132–146)
TOTAL PROTEIN: 6.7 G/DL (ref 6.4–8.3)
VANCOMYCIN TROUGH: 10.9 MCG/ML (ref 5–16)
VITAMIN B-12: 1446 PG/ML (ref 211–946)
WBC # BLD: 5.7 E9/L (ref 4.5–11.5)

## 2020-05-09 PROCEDURE — 85027 COMPLETE CBC AUTOMATED: CPT

## 2020-05-09 PROCEDURE — 6370000000 HC RX 637 (ALT 250 FOR IP): Performed by: INTERNAL MEDICINE

## 2020-05-09 PROCEDURE — 82607 VITAMIN B-12: CPT

## 2020-05-09 PROCEDURE — P9047 ALBUMIN (HUMAN), 25%, 50ML: HCPCS | Performed by: INTERNAL MEDICINE

## 2020-05-09 PROCEDURE — 36415 COLL VENOUS BLD VENIPUNCTURE: CPT

## 2020-05-09 PROCEDURE — 80053 COMPREHEN METABOLIC PANEL: CPT

## 2020-05-09 PROCEDURE — 2500000003 HC RX 250 WO HCPCS: Performed by: INTERNAL MEDICINE

## 2020-05-09 PROCEDURE — 82140 ASSAY OF AMMONIA: CPT

## 2020-05-09 PROCEDURE — 99233 SBSQ HOSP IP/OBS HIGH 50: CPT | Performed by: SURGERY

## 2020-05-09 PROCEDURE — 2700000000 HC OXYGEN THERAPY PER DAY

## 2020-05-09 PROCEDURE — 2580000003 HC RX 258: Performed by: NURSE PRACTITIONER

## 2020-05-09 PROCEDURE — 2580000003 HC RX 258: Performed by: INTERNAL MEDICINE

## 2020-05-09 PROCEDURE — 6360000002 HC RX W HCPCS: Performed by: INTERNAL MEDICINE

## 2020-05-09 PROCEDURE — 6370000000 HC RX 637 (ALT 250 FOR IP): Performed by: STUDENT IN AN ORGANIZED HEALTH CARE EDUCATION/TRAINING PROGRAM

## 2020-05-09 PROCEDURE — 82746 ASSAY OF FOLIC ACID SERUM: CPT

## 2020-05-09 PROCEDURE — 1200000000 HC SEMI PRIVATE

## 2020-05-09 PROCEDURE — 85610 PROTHROMBIN TIME: CPT

## 2020-05-09 RX ORDER — SODIUM CHLORIDE 0.9 % (FLUSH) 0.9 %
10 SYRINGE (ML) INJECTION PRN
Status: DISCONTINUED | OUTPATIENT
Start: 2020-05-09 | End: 2020-05-12 | Stop reason: HOSPADM

## 2020-05-09 RX ORDER — LORAZEPAM 1 MG/1
3 TABLET ORAL
Status: DISCONTINUED | OUTPATIENT
Start: 2020-05-09 | End: 2020-05-10

## 2020-05-09 RX ORDER — LORAZEPAM 2 MG/ML
2 INJECTION INTRAMUSCULAR
Status: DISCONTINUED | OUTPATIENT
Start: 2020-05-09 | End: 2020-05-10

## 2020-05-09 RX ORDER — LORAZEPAM 2 MG/ML
3 INJECTION INTRAMUSCULAR
Status: DISCONTINUED | OUTPATIENT
Start: 2020-05-09 | End: 2020-05-10

## 2020-05-09 RX ORDER — LORAZEPAM 1 MG/1
1 TABLET ORAL
Status: DISCONTINUED | OUTPATIENT
Start: 2020-05-09 | End: 2020-05-10

## 2020-05-09 RX ORDER — PANTOPRAZOLE SODIUM 40 MG/1
40 TABLET, DELAYED RELEASE ORAL
Status: DISCONTINUED | OUTPATIENT
Start: 2020-05-10 | End: 2020-05-12 | Stop reason: HOSPADM

## 2020-05-09 RX ORDER — LORAZEPAM 1 MG/1
4 TABLET ORAL
Status: DISCONTINUED | OUTPATIENT
Start: 2020-05-09 | End: 2020-05-10

## 2020-05-09 RX ORDER — LORAZEPAM 1 MG/1
2 TABLET ORAL
Status: DISCONTINUED | OUTPATIENT
Start: 2020-05-09 | End: 2020-05-10

## 2020-05-09 RX ORDER — LORAZEPAM 2 MG/ML
1 INJECTION INTRAMUSCULAR
Status: DISCONTINUED | OUTPATIENT
Start: 2020-05-09 | End: 2020-05-10

## 2020-05-09 RX ORDER — LORAZEPAM 2 MG/ML
4 INJECTION INTRAMUSCULAR
Status: DISCONTINUED | OUTPATIENT
Start: 2020-05-09 | End: 2020-05-10

## 2020-05-09 RX ORDER — SODIUM CHLORIDE 0.9 % (FLUSH) 0.9 %
10 SYRINGE (ML) INJECTION EVERY 12 HOURS SCHEDULED
Status: DISCONTINUED | OUTPATIENT
Start: 2020-05-09 | End: 2020-05-12 | Stop reason: HOSPADM

## 2020-05-09 RX ADMIN — LACTULOSE 20 G: 20 SOLUTION ORAL at 20:59

## 2020-05-09 RX ADMIN — Medication 10 ML: at 08:25

## 2020-05-09 RX ADMIN — ALBUMIN (HUMAN) 25 G: 0.25 INJECTION, SOLUTION INTRAVENOUS at 13:44

## 2020-05-09 RX ADMIN — FOLIC ACID 1 MG: 1 TABLET ORAL at 08:26

## 2020-05-09 RX ADMIN — Medication 10 ML: at 21:07

## 2020-05-09 RX ADMIN — RIFAXIMIN 550 MG: 550 TABLET ORAL at 20:59

## 2020-05-09 RX ADMIN — LACTULOSE 20 G: 20 SOLUTION ORAL at 13:44

## 2020-05-09 RX ADMIN — WATER 1 G: 1 INJECTION INTRAMUSCULAR; INTRAVENOUS; SUBCUTANEOUS at 20:59

## 2020-05-09 RX ADMIN — SPIRONOLACTONE 50 MG: 25 TABLET ORAL at 17:40

## 2020-05-09 RX ADMIN — Medication 100 MG: at 08:26

## 2020-05-09 RX ADMIN — NADOLOL 20 MG: 20 TABLET ORAL at 08:26

## 2020-05-09 RX ADMIN — METRONIDAZOLE 500 MG: 500 INJECTION, SOLUTION INTRAVENOUS at 07:21

## 2020-05-09 RX ADMIN — GUAIFENESIN 600 MG: 600 TABLET, EXTENDED RELEASE ORAL at 20:59

## 2020-05-09 RX ADMIN — RIFAXIMIN 550 MG: 550 TABLET ORAL at 08:26

## 2020-05-09 RX ADMIN — SODIUM CHLORIDE, PRESERVATIVE FREE 10 ML: 5 INJECTION INTRAVENOUS at 21:00

## 2020-05-09 RX ADMIN — ALBUMIN (HUMAN) 25 G: 0.25 INJECTION, SOLUTION INTRAVENOUS at 06:11

## 2020-05-09 RX ADMIN — GUAIFENESIN 600 MG: 600 TABLET, EXTENDED RELEASE ORAL at 08:26

## 2020-05-09 RX ADMIN — LACTULOSE 20 G: 20 SOLUTION ORAL at 08:26

## 2020-05-09 RX ADMIN — SPIRONOLACTONE 50 MG: 25 TABLET ORAL at 08:26

## 2020-05-09 RX ADMIN — Medication 2000 MG: at 00:45

## 2020-05-09 ASSESSMENT — PAIN SCALES - GENERAL
PAINLEVEL_OUTOF10: 0

## 2020-05-09 ASSESSMENT — PAIN DESCRIPTION - PROGRESSION: CLINICAL_PROGRESSION: GRADUALLY WORSENING

## 2020-05-09 NOTE — PLAN OF CARE
Problem: Pain:  Goal: Pain level will decrease  Description: Pain level will decrease  5/9/2020 1141 by Marcellus Pyle RN  Outcome: Met This Shift     Problem: Pain:  Goal: Control of acute pain  Description: Control of acute pain  5/9/2020 1141 by Marcellus Pyle RN  Outcome: Met This Shift     Problem: Pain:  Goal: Control of chronic pain  Description: Control of chronic pain  5/9/2020 1141 by Marcellus Pyle RN  Outcome: Met This Shift     Problem: Falls - Risk of:  Goal: Will remain free from falls  Description: Will remain free from falls  5/9/2020 1141 by Marcellus Pyle RN  Outcome: Met This Shift

## 2020-05-09 NOTE — PROGRESS NOTES
Internal Medicine Progress Note    [facility]. Johana Urias., & [address] Johana Urias., F.A.C.O.I. Amaris Sanford D.O., F.A.C.O.I. Primary Care Physician: No primary care provider on file. Admitting Physician:  Moe Rivera DO  Admission date and time: 5/7/2020  2:16 PM    Room:  [address] [place] [address]  Admitting diagnosis: Ascites [R18.8]    Patient Name: Dylan Steen  MRN: [id number]    Date of Service: 5/9/2020     Subjective:    Wade Chu is a 48 y. o.  male who was seen and examined today,5/9/2020, at the bedside. He underwent paracentesis x224-hour timeframe with the removal of approximately 20 L of fluid. Overall he is feeling better and does not appear to have suffered any unfortunate sequela of such a large fluid volume removal.  He denies any symptoms of alcohol withdrawal at this present time. States that he was drinking 1 to 2 L of alcohol in the form of wine per day prior to hospitalization. Admits to a decrease in alcohol consumption for a few days prior to hospitalization because he of his discomfort related to his abdominal distention. We discussed withholding any further paracentesis despite his still presently rounded and distended abdomen due to his large volume fluid removal until Monday at which point we will reevaluate and patient voices understanding. He remains weak and deconditioned. No family present during my examination. Review of System:   Constitutional:   Denies fever or chills, weight loss or gain, fatigue or malaise. HEENT:   Denies ear pain, sore throat, sinus or eye problems. Cardiovascular:   Denies any chest pain, irregular heartbeats, or palpitations. Respiratory:   Denies shortness of breath, coughing, sputum production, hemoptysis, or wheezing. Gastrointestinal:   Denies nausea, vomiting, diarrhea, or constipation. Admits to mild abdominal discomfort with distention.   Genitourinary:    Denies any urgency, frequency, hematuria. Voiding  without difficulty. Extremities:   Denies lower extremity swelling, edema or cyanosis. Neurology:    Denies any headache or focal neurological deficits, Denies generalized weakness or memory difficulty. Psch:   Denies being anxious or depressed. Musculoskeletal:    Denies  myalgias, joint complaints or back pain. Integumentary:   Denies any rashes, ulcers, or excoriations. Denies bruising. Hematologic/Lymphatic:  Denies bruising or bleeding. Physical Exam:  I/O this shift:  In: 100 [IV Piggyback:100]  Out: -     Intake/Output Summary (Last 24 hours) at 5/9/2020 0934  Last data filed at 5/9/2020 0720  Gross per 24 hour   Intake 2103.49 ml   Output 240 ml   Net 1863.49 ml   I/O last 3 completed shifts: In: 2003.5 [P.O.:1260; I.V.:43.5; IV Piggyback:700]  Out: 240 [Urine:240]  Patient Vitals for the past 96 hrs (Last 3 readings):   Weight   05/09/20 0616 278 lb (126.1 kg)   05/08/20 0611 278 lb (126.1 kg)   05/07/20 2111 279 lb (126.6 kg)       Vital Signs:   Blood pressure (!) 102/58, pulse 72, temperature 97.8 °F (36.6 °C), temperature source Oral, resp. rate 18, height 5' 10\" (1.778 m), weight 278 lb (126.1 kg), SpO2 93 %. Kay Amin is a 48 y. o.  male who is alert, responsive, oriented to person, place, and time. General appearance:   Well preserved, alert, no distress. Head:  Normocephalic. No masses, lesions or tenderness. Eyes:  PERRLA. EOMI. Sclera clear. Buccal mucosa moist.  ENT:  Ears normal. Mucosa normal.  Neck:    Supple. Trachea midline. No thyromegaly. No JVD. No bruits. Heart:    Rhythm regular. Rate controlled. No murmurs. Lungs:    Symmetrical. Clear to auscultation bilaterally. No wheezes. No rhonchi. No rales. Abdomen:   Soft. Non-tender. Non-distended. Bowel sounds positive. No organomegaly or masses. Moderate distention--ascities. Extremities:    Peripheral pulses present. No peripheral edema. No ulcers. No cyanosis.  No clubbing. Neurologic:    Alert x 3. No focal deficit. Cranial nerves grossly intact. No focal weakness. Psych:   Behavior is normal. Mood appears normal. Speech is not rapid and/or pressured. Musculoskeletal:   Spine ROM normal. Muscular strength intact. Gait not assessed. Integumentary:  No rashes  Skin normal color and texture. Genitalia/Breast:  Deferred    Allergy:  No Known Allergies     Medication:  Scheduled Meds:   lactulose  20 g Oral TID    spironolactone  50 mg Oral BID    rifaximin  550 mg Oral BID    albumin human  25 g Intravenous Q8H    sodium chloride flush  10 mL Intravenous 2 times per day    cefTRIAXone (ROCEPHIN) IV  1 g Intravenous R56X    folic acid  1 mg Oral Daily    vitamin B-1  100 mg Oral Daily    nadolol  20 mg Oral Daily    guaiFENesin  600 mg Oral BID     Continuous Infusions:   pantoprozole (PROTONIX) infusion Stopped (05/09/20 0610)       Objective Data:  CBC:   Recent Labs     05/07/20  1517  05/08/20  0606 05/08/20  1345 05/09/20  0722   WBC 9.1  --  7.1  --  5.7   HGB 12.4*   < > 10.2* 9.9* 9.4*     --  95*  --  79*    < > = values in this interval not displayed.      BMP:    Recent Labs     05/08/20  0606 05/08/20  1916 05/09/20  0722   * 131* 130*   K 4.0 3.9 3.5   CL 87* 87* 88*   CO2 28 30* 31*   BUN 13 11 8   CREATININE 0.5* 0.5* 0.4*   GLUCOSE 106* 184* 131*     CMP:    Lab Results   Component Value Date     05/09/2020    K 3.5 05/09/2020    K 5.6 05/07/2020    CL 88 05/09/2020    CO2 31 05/09/2020    BUN 8 05/09/2020    CREATININE 0.4 05/09/2020    GFRAA >60 05/09/2020    LABGLOM >60 05/09/2020    GLUCOSE 131 05/09/2020    PROT 6.7 05/09/2020    LABALBU 3.5 05/09/2020    CALCIUM 8.6 05/09/2020    BILITOT 5.8 05/09/2020    ALKPHOS 105 05/09/2020     05/09/2020    ALT 32 05/09/2020     Hepatic:   Recent Labs     05/07/20  1517 05/08/20  0606 05/09/20  0722   * 193* 165*   ALT 51* 39 32   BILITOT 4.8* 5.4* 5.8*   ALKPHOS 184* 143* 105     Troponin:   Recent Labs     05/07/20  1517   TROPONINI <0.01     BNP: No results for input(s): BNP in the last 72 hours. Lipids:   Recent Labs     05/08/20  0606   CHOL 100   HDL 7     ABGs: No results found for: PHART, PO2ART, HCF7HPE  INR:   Recent Labs     05/07/20  1517 05/08/20  0606 05/09/20  0722   INR 1.4 1.7 1.9   PROTIME 16.0* 19.5* 22.0*     RAD: Xr Chest Standard (2 Vw)    Result Date: 5/7/2020  EXAMINATION: ONE SUPINE XRAY VIEW(S) OF THE ABDOMEN; TWO XRAY VIEWS OF THE CHEST 5/7/2020 8:18 pm COMPARISON: None. HISTORY: ORDERING SYSTEM PROVIDED HISTORY: distention lactic acidosis TECHNOLOGIST PROVIDED HISTORY: Reason for exam:->distention lactic acidosis FINDINGS: Chest-two views: There is free intraperitoneal air. The mediastinal and cardiac contours are normal.  The lungs are clear. There is no pleural effusion or pneumothorax identified. Abdomen: There are multiple abnormally dilated loops of small bowel within the left mid abdomen. There is a paucity of air-filled distal colon. 1. Free intraperitoneal air. 2. Multiple dilated loops of small bowel within the mid abdomen concerning for a bowel obstruction or an ileus. 3. No acute cardiopulmonary process identified. The above findings were discussed with [place] at 8:40 p.m. on 05/07/2020. Xr Abdomen (kub) (single Ap View)    Result Date: 5/7/2020  EXAMINATION: ONE SUPINE XRAY VIEW(S) OF THE ABDOMEN; TWO XRAY VIEWS OF THE CHEST 5/7/2020 8:18 pm COMPARISON: None. HISTORY: ORDERING SYSTEM PROVIDED HISTORY: distention lactic acidosis TECHNOLOGIST PROVIDED HISTORY: Reason for exam:->distention lactic acidosis FINDINGS: Chest-two views: There is free intraperitoneal air. The mediastinal and cardiac contours are normal.  The lungs are clear. There is no pleural effusion or pneumothorax identified. Abdomen: There are multiple abnormally dilated loops of small bowel within the left mid abdomen.   There is a paucity of air-filled distal colon. 1. Free intraperitoneal air. 2. Multiple dilated loops of small bowel within the mid abdomen concerning for a bowel obstruction or an ileus. 3. No acute cardiopulmonary process identified. The above findings were discussed with Teena Salinas at 8:40 p.m. on 05/07/2020. Ct Abdomen Pelvis W Iv Contrast Additional Contrast? None    Result Date: 5/8/2020  EXAMINATION: CT OF THE ABDOMEN AND PELVIS WITH CONTRAST 5/7/2020 10:12 pm TECHNIQUE: CT of the abdomen and pelvis was performed with the administration of intravenous contrast. Multiplanar reformatted images are provided for review. Dose modulation, iterative reconstruction, and/or weight based adjustment of the mA/kV was utilized to reduce the radiation dose to as low as reasonably achievable. COMPARISON: CT abdomen and pelvis 12/24/2006 HISTORY: ORDERING SYSTEM PROVIDED HISTORY: Free air post paracentesis, please call GI fellow if cannot fit in scanner TECHNOLOGIST PROVIDED HISTORY: Reason for exam:->Free air post paracentesis, please call GI fellow if cannot fit in scanner Additional Contrast?->None FINDINGS: LOWER CHEST Lung bases: Focal ground-glass abnormality in the middle lobe along the major fissure, probably outside the field of view on 2006 comparison. This measures 4.4 x 1.9 cm in axial plane. Normal included heart and pericardium. ABDOMEN Liver: Shrunken nodular liver, diffusely low in attenuation suggesting underlying infiltrative disease such as steatosis. A low-attenuation cystic focus has increased in size since 2006, now measuring 3.8 cm in greatest axial dimension. Gallbladder: Grossly unremarkable, not well evaluated. Biliary: No intra or extrahepatic bile duct dilatation. Pancreas: Normal. Spleen: Normal. Adrenals: Normal. Kidneys: Exophytic right renal simple cysts have increased in size. Kidneys are otherwise symmetric in size and parenchymal enhancement.   A too small to characterize exophytic focus in the left mid polar region is noted. No hydronephrosis or nephrolithiasis. GI Tract: Normal distal esophagus, stomach and duodenal sweep. No apparent bowel wall thickening or obstruction. Peritoneum/Retroperitoneum: Large volume abdominal ascites with mesenteric edema and shotty reactive lymphadenopathy. Vascular: Normal caliber abdominal aorta and IVC. Portosystemic collaterals appreciated, including recanalized paraumbilical vein and prominent paraesophageal varices. PELVIS Genitourinary: Normal urinary bladder. Normal reproductive organs. Other: No free fluid. No enlarged lymph nodes. MUSCULOSKELETAL Bones and Soft Tissues: Left inguinal canal contains ascitic fluid. Diffuse body wall anasarca. Portosystemic collaterals along the ventral abdomen. No acute osseous abnormality. T11 superior endplate wedge deformity appears chronic. No evidence of pneumoperitoneum following paracentesis. Large volume of abdominopelvic ascites remains. Cirrhosis with features of portal venous hypertension, including prominent portosystemic collaterals. A hepatic simple cyst has increased in size since 2006. New bubbly ground-glass opacity in the middle lobe along the fissure, outside the field of view on comparison exam.  This is indeterminate. Although this could represent focal infection/inflammatory focus, additional considerations would include organizing pneumonia or adenocarcinoma spectrum lesion. Recommend follow-up CT chest in 3 months. Us Abdomen Limited    Result Date: 5/7/2020  EXAMINATION: RIGHT UPPER QUADRANT ULTRASOUND 5/7/2020 10:20 pm COMPARISON: CT abdomen pelvis 05/07/2020 HISTORY: ORDERING SYSTEM PROVIDED HISTORY: Decompensated cirrhosis, r/o hepatoma TECHNOLOGIST PROVIDED HISTORY: Reason for exam:->Decompensated cirrhosis, r/o hepatoma FINDINGS: LIVER:  There is diffusely abnormal increased echotexture throughout the liver consistent with hepatocellular disease.   Decreased through transmission of sound require the institution of Lasix as well as part of his cirrhotic regimen. We will maintain Xifaxan and Chronulac in the setting of his decompensated alcoholic cirrhosis as well as continuation of nadolol per the recommendations of GI. Rocephin will be continued for SBP prophylaxis. Patient underwent EGD which did show grade D esophagitis and recommendations were for continuation of the Protonix infusion x72 hours. Vancomycin and Flagyl will be discontinued at this time. We will continue empiric thiamine folate replacement. [facility] protocol in place. Continue current care and see additional orders for details. I reviewed the patient's past medical, surgical history and medication. Patient's medications were reviewed/continued/adjusted. Labs as ordered. Please see orders for further plan of care. Rhythm strips reviewed as well as consultant recommendations/notes and/or discussion. I reviewed the  course of events since last visit. More than 50% of my  time was spent at the bedside counseling and/or coordination of care with the patient and/or family with face to face contact. This time was spent reviewing notes and laboratory data, instructing and counseling the patient. Time I spent with the family or surrogate(s) is included only if the patient was incapable of providing the necessary information or participating in medical decisionsI also discussed the differential diagnosis and all of the proposed management plans with the patient and individuals accompanying the patient. Peter Linda requires this high level of physician care and nursing in the Telemetry due the complexity of decision management and chance of rapid decline or death. Continued cardiac monitoring and higher level of nursing are required. I am ready available for decision making and intervention. I reviewed the relevant imaging studies and available reports.   I also discussed the differential diagnosis and all of the proposed management plans with the patient and individuals accompanying the patient to this visit. I reviewed the relevant imaging studies and available reports.         CINTIA Jenkins CNP  On 5/9/2020  9:34 AM

## 2020-05-09 NOTE — PROGRESS NOTES
Surgery Progress Note            Chief complaint:   Patient Active Problem List   Diagnosis    Evisceration of bowel    Decompensated alcoholic cirrhosis (HCC)    Ascites    Hyponatremia       S: doing well no acute changes    O:   Vitals:    05/09/20 0815   BP: (!) 102/58   Pulse: 72   Resp: 18   Temp: 97.8 °F (36.6 °C)   SpO2: 93%       Intake/Output Summary (Last 24 hours) at 5/9/2020 1018  Last data filed at 5/9/2020 1000  Gross per 24 hour   Intake 2523.49 ml   Output 240 ml   Net 2283.49 ml           Labs:  Recent Labs     05/07/20  1517  05/08/20  0606 05/08/20  1345 05/09/20  0722   WBC 9.1  --  7.1  --  5.7   HGB 12.4*   < > 10.2* 9.9* 9.4*   HCT 34.5*   < > 28.1* 28.5* 26.7*    < > = values in this interval not displayed.      Lab Results   Component Value Date    CREATININE 0.4 (L) 05/09/2020    BUN 8 05/09/2020     (L) 05/09/2020    K 3.5 05/09/2020    CL 88 (L) 05/09/2020    CO2 31 (H) 05/09/2020     Recent Labs     05/07/20  1517   LIPASE 46       Physical exam:   BP (!) 102/58   Pulse 72   Temp 97.8 °F (36.6 °C) (Oral)   Resp 18   Ht 5' 10\" (1.778 m)   Wt 278 lb (126.1 kg)   SpO2 93%   BMI 39.89 kg/m²   General appearance: NAD  Head: NCAT  Neck: supple, no masses  Lungs: equal chest rise bilateral  Heart: S1S2 present  Abdomen: soft, nontender, very distended  Skin; no lesions  Gu: no cva tenderness  Extremities: extremities normal, atraumatic, no cyanosis or edema    A:  Ascites with possible free air secondary to procedure no perforation of viscus    P: no further recs, will sign off    Emanuel Marshall MD  5/9/2020

## 2020-05-09 NOTE — PROGRESS NOTES
from prednisolone due to risk/questionable infection  - Discussed Alcohol cessation  - RUQ negative for hepatoma mass, AFP, pending   - Grade 0-1 HE on exam start on Lactulose 20mg BID titrate to 2-3 soft bowel movements daily   - EGD 5/8/20 with [facility] - hx of small varices 2017 - recommend yearly EGD- Will hold Nadolol, given decompensation, pt to follow in office with EGDs for primary prophylaxis. Can have further discussion of Nadolol as outpt  - Iron studies could support hemo chrom however likely Alcohol induced elevation - will order HFE gene testing as outpt  - Negative Hep panel, need Hep B vaccine as outpt  - AI work-up negative in the past  - Paracentesis - 5/8/20 - massive ascites, ~20L removed this admission - elevated SAAG, low protein (1.4), PMN<250 - Pt likely to benefit from SBP prophylaxis upon d/c given low AFTP. - Aldactone provided, nephrology consult for diuretic management/hyponatremia  - Pt counseled about 2gm NA diet and need from compliance   - Pt not exemption pathway LT candidate, continue to monitor for GENARO.      2. Acute on Chronic Macrocytic Anemia - likely   - Hgb 10.6 at baseline   - B12 folate iron studies and TIBC ordered      3. Hyponatremia   - per admitting   - maybe due to delusional effect with massive ascites  - trend with daily CMP     Pt was seen, d/w, and examined with Dr. Derrick Chaudhary DO  5/9/2020  1:44 PM    I was present at bedside and performed my own exam.  I agree with the above findings and plan. EGD from yesterday reviewed with patient. Pt still with low sodium and ascites on exam.  Abd distended with fluid wave, bs +. Given significant ascites and low sodium I do recommend nephrology consult and input to guide diuretics here. I do not recommend a pleurex catheter as would have high risk for infection or potential for renal insufficiency. Pt with increasing ammonia, Xifaxan and Lactulose. Our service will follow.     Michelle Holder, DO  5/9/2020  2:06 PM

## 2020-05-09 NOTE — PROGRESS NOTES
Pharmacy Consultation Note  (Antibiotic Dosing and Monitoring)    Initial consult date: 2020  Consulting physician: Dr Shun Guerra  Drug: Vancomycin  Indication: Empiric    Age/  Gender Height Weight IBW Dosing weight  Allergy Information   54 y.o./male 5' 10\" (177.8 cm) 279 lb (126.6 kg)     Ideal body weight: 73 kg (160 lb 15 oz)  Adjusted ideal body weight: 94.2 kg (207 lb 12.2 oz)  126.6  Patient has no known allergies. Temp (24hrs), Av.9 °F (37.2 °C), Min:98.7 °F (37.1 °C), Max:99 °F (37.2 °C)          Date  WBC BUN SCr CrCl  (mL/min) Drug/Dose Time   Given Level(s)   (Time) Comments   2020 7.1 13 0.5 > 100   Vancomycin 2000 mg IV Q12hr  0023  1352 Trough 10.9 @ 2333     2020 5.7 8 0.4 >100 Vancomycin 2000 mg IV Q12H 0045                                 Intake/Output Summary (Last 24 hours) at 2020 0828  Last data filed at 2020 4487  Gross per 24 hour   Intake 630.82 ml   Output 350 ml   Net 280.82 ml       Historical Cultures:  No results found for: ORG  No results for input(s): BC in the last 72 hours. Cultures:  available culture and sensitivity results were reviewed in [facility]    Assessment:  · 48 y.o. male who presented with massive ascites 2/2 decompensated alcoholic cirrhosis. Last paracentesis in January. Underwent paracentesis in ED on . Vancomycin, ceftriaxone, and metronidazole initiated empirically. · Estimated CrCl > 100 mL/min based on current SCr of 0.5 mg/dL  · Goal trough level = 15-20 mcg/mL; trough 10.9  @ 2333    Plan:  · Vancomycin has been discontinued.    · Clinical pharmacy will sign off at this time  · Please reconsult pharmacy if needed       Bridget Guy, PharmD 2020 9:34 AM

## 2020-05-10 LAB
ALBUMIN SERPL-MCNC: 3.4 G/DL (ref 3.5–5.2)
ALP BLD-CCNC: 108 U/L (ref 40–129)
ALT SERPL-CCNC: 31 U/L (ref 0–40)
ANION GAP SERPL CALCULATED.3IONS-SCNC: 12 MMOL/L (ref 7–16)
AST SERPL-CCNC: 161 U/L (ref 0–39)
BILIRUB SERPL-MCNC: 4.5 MG/DL (ref 0–1.2)
BODY FLUID CULTURE, STERILE: NORMAL
BUN BLDV-MCNC: 8 MG/DL (ref 6–20)
CALCIUM SERPL-MCNC: 8.5 MG/DL (ref 8.6–10.2)
CERULOPLASMIN: 18 MG/DL (ref 15–30)
CHLORIDE BLD-SCNC: 92 MMOL/L (ref 98–107)
CHLORIDE URINE RANDOM: 63 MMOL/L
CO2: 28 MMOL/L (ref 22–29)
CREAT SERPL-MCNC: 0.4 MG/DL (ref 0.7–1.2)
CREATININE URINE: 72 MG/DL (ref 40–278)
GFR AFRICAN AMERICAN: >60
GFR NON-AFRICAN AMERICAN: >60 ML/MIN/1.73
GLUCOSE BLD-MCNC: 117 MG/DL (ref 74–99)
GRAM STAIN RESULT: NORMAL
HCT VFR BLD CALC: 28.6 % (ref 37–54)
HEMOGLOBIN: 10 G/DL (ref 12.5–16.5)
INR BLD: 2.2
MCH RBC QN AUTO: 37.7 PG (ref 26–35)
MCHC RBC AUTO-ENTMCNC: 35 % (ref 32–34.5)
MCV RBC AUTO: 107.9 FL (ref 80–99.9)
PDW BLD-RTO: 18.1 FL (ref 11.5–15)
PLATELET # BLD: 91 E9/L (ref 130–450)
PLATELET CONFIRMATION: NORMAL
PMV BLD AUTO: 11.1 FL (ref 7–12)
POTASSIUM SERPL-SCNC: 3.5 MMOL/L (ref 3.5–5)
POTASSIUM, UR: 21.3 MMOL/L
PROTHROMBIN TIME: 24.9 SEC (ref 9.3–12.4)
RBC # BLD: 2.65 E12/L (ref 3.8–5.8)
SODIUM BLD-SCNC: 132 MMOL/L (ref 132–146)
SODIUM URINE: 95 MMOL/L
TOTAL PROTEIN: 6.6 G/DL (ref 6.4–8.3)
WBC # BLD: 6.8 E9/L (ref 4.5–11.5)

## 2020-05-10 PROCEDURE — 6370000000 HC RX 637 (ALT 250 FOR IP): Performed by: INTERNAL MEDICINE

## 2020-05-10 PROCEDURE — 2580000003 HC RX 258: Performed by: INTERNAL MEDICINE

## 2020-05-10 PROCEDURE — 85027 COMPLETE CBC AUTOMATED: CPT

## 2020-05-10 PROCEDURE — 84133 ASSAY OF URINE POTASSIUM: CPT

## 2020-05-10 PROCEDURE — 82570 ASSAY OF URINE CREATININE: CPT

## 2020-05-10 PROCEDURE — 85610 PROTHROMBIN TIME: CPT

## 2020-05-10 PROCEDURE — 84300 ASSAY OF URINE SODIUM: CPT

## 2020-05-10 PROCEDURE — 6360000002 HC RX W HCPCS: Performed by: INTERNAL MEDICINE

## 2020-05-10 PROCEDURE — 1200000000 HC SEMI PRIVATE

## 2020-05-10 PROCEDURE — 80053 COMPREHEN METABOLIC PANEL: CPT

## 2020-05-10 PROCEDURE — 97530 THERAPEUTIC ACTIVITIES: CPT

## 2020-05-10 PROCEDURE — 97110 THERAPEUTIC EXERCISES: CPT

## 2020-05-10 PROCEDURE — 82436 ASSAY OF URINE CHLORIDE: CPT

## 2020-05-10 PROCEDURE — 2580000003 HC RX 258: Performed by: NURSE PRACTITIONER

## 2020-05-10 PROCEDURE — 36415 COLL VENOUS BLD VENIPUNCTURE: CPT

## 2020-05-10 PROCEDURE — 6370000000 HC RX 637 (ALT 250 FOR IP): Performed by: STUDENT IN AN ORGANIZED HEALTH CARE EDUCATION/TRAINING PROGRAM

## 2020-05-10 RX ORDER — ALBUMIN (HUMAN) 12.5 G/50ML
50 SOLUTION INTRAVENOUS ONCE
Status: COMPLETED | OUTPATIENT
Start: 2020-05-11 | End: 2020-05-11

## 2020-05-10 RX ORDER — FUROSEMIDE 40 MG/1
40 TABLET ORAL 2 TIMES DAILY
Status: DISCONTINUED | OUTPATIENT
Start: 2020-05-10 | End: 2020-05-12 | Stop reason: HOSPADM

## 2020-05-10 RX ORDER — LORAZEPAM 1 MG/1
1 TABLET ORAL EVERY 4 HOURS PRN
Status: DISCONTINUED | OUTPATIENT
Start: 2020-05-10 | End: 2020-05-12 | Stop reason: HOSPADM

## 2020-05-10 RX ORDER — FOLIC ACID 1 MG/1
2 TABLET ORAL DAILY
Status: DISCONTINUED | OUTPATIENT
Start: 2020-05-10 | End: 2020-05-12 | Stop reason: HOSPADM

## 2020-05-10 RX ADMIN — GUAIFENESIN 600 MG: 600 TABLET, EXTENDED RELEASE ORAL at 21:23

## 2020-05-10 RX ADMIN — RIFAXIMIN 550 MG: 550 TABLET ORAL at 08:24

## 2020-05-10 RX ADMIN — FOLIC ACID 2 MG: 1 TABLET ORAL at 08:24

## 2020-05-10 RX ADMIN — GUAIFENESIN 600 MG: 600 TABLET, EXTENDED RELEASE ORAL at 08:24

## 2020-05-10 RX ADMIN — RIFAXIMIN 550 MG: 550 TABLET ORAL at 21:23

## 2020-05-10 RX ADMIN — PANTOPRAZOLE SODIUM 40 MG: 40 TABLET, DELAYED RELEASE ORAL at 06:29

## 2020-05-10 RX ADMIN — Medication 10 ML: at 08:25

## 2020-05-10 RX ADMIN — SODIUM CHLORIDE, PRESERVATIVE FREE 10 ML: 5 INJECTION INTRAVENOUS at 21:23

## 2020-05-10 RX ADMIN — SPIRONOLACTONE 50 MG: 25 TABLET ORAL at 17:06

## 2020-05-10 RX ADMIN — WATER 1 G: 1 INJECTION INTRAMUSCULAR; INTRAVENOUS; SUBCUTANEOUS at 21:23

## 2020-05-10 RX ADMIN — Medication 100 MG: at 08:24

## 2020-05-10 RX ADMIN — LACTULOSE 20 G: 20 SOLUTION ORAL at 14:43

## 2020-05-10 RX ADMIN — FUROSEMIDE 40 MG: 40 TABLET ORAL at 17:06

## 2020-05-10 RX ADMIN — SPIRONOLACTONE 50 MG: 25 TABLET ORAL at 08:24

## 2020-05-10 ASSESSMENT — PAIN DESCRIPTION - PROGRESSION: CLINICAL_PROGRESSION: GRADUALLY WORSENING

## 2020-05-10 ASSESSMENT — PAIN SCALES - GENERAL
PAINLEVEL_OUTOF10: 0

## 2020-05-10 NOTE — PROGRESS NOTES
Internal Medicine Progress Note    Barby Finney. Dano Malcolm., & [address] Dano Malcolm., F.A.C.O.I. Laura Harp D.O., F.A.C.O.I. Primary Care Physician: No primary care provider on file. Admitting Physician:  Jeevan Menjivar DO  Admission date and time: 5/7/2020  2:16 PM    Room:  [address] [place] [address]  Admitting diagnosis: Ascites [R18.8]    Patient Name: Dereck Carroll  MRN: [id number]    Date of Service: 5/10/2020     Subjective:  Yobani Hopper is a 48 y. o.  male who was seen and examined today,5/10/2020, at the bedside. Yobani Hopper is resting very comfortably and is seated at the bedside chair today. He continues to accumulate additional abdominal ascites. He is tolerating a diet and would like to become more ambulatory today. We discussed repeat paracentesis tomorrow. He exhibits no outward signs of alcohol withdrawal.    Review of System:   Constitutional:   Denies fever or chills, weight loss or gain, fatigue or malaise. HEENT:   Denies ear pain, sore throat, sinus or eye problems. Cardiovascular:   Denies any chest pain, irregular heartbeats, or palpitations. Respiratory:   Denies shortness of breath, coughing, sputum production, hemoptysis, or wheezing. Gastrointestinal:   Denies nausea, vomiting, diarrhea, or constipation. Admits to mild abdominal discomfort with distention. Genitourinary:    Denies any urgency, frequency, hematuria. Voiding  without difficulty. Extremities:   Denies lower extremity swelling, edema or cyanosis. Neurology:    Denies any headache or focal neurological deficits, Denies generalized weakness or memory difficulty. Psch:   Denies being anxious or depressed. Musculoskeletal:    Denies  myalgias, joint complaints or back pain. Integumentary:   Denies any rashes, ulcers, or excoriations. Denies bruising. Hematologic/Lymphatic:  Denies bruising or bleeding. Physical Exam:  No intake/output data recorded.     Intake/Output Summary (Last 24 hours) at 5/10/2020 0858  Last data filed at 5/9/2020 2104  Gross per 24 hour   Intake 860 ml   Output --   Net 860 ml   I/O last 3 completed shifts: In: 960 [P.O.:860; IV Piggyback:100]  Out: -   Patient Vitals for the past 96 hrs (Last 3 readings):   Weight   05/10/20 0600 247 lb 11.2 oz (112.4 kg)   05/09/20 0616 278 lb (126.1 kg)   05/08/20 0611 278 lb (126.1 kg)       Vital Signs:   Blood pressure (!) 102/52, pulse 80, temperature 98.2 °F (36.8 °C), temperature source Oral, resp. rate 18, height 5' 10\" (1.778 m), weight 247 lb 11.2 oz (112.4 kg), SpO2 93 %. Wade Chu is a 48 y. o.  male who is alert, responsive, oriented to person, place, and time. General appearance:   Well preserved, alert, no distress. Head:  Normocephalic. No masses, lesions or tenderness. Eyes:  PERRLA. EOMI. Sclera clear. Buccal mucosa moist.  ENT:  Ears normal. Mucosa normal.  Neck:    Supple. Trachea midline. No thyromegaly. No JVD. No bruits. Heart:    Rhythm regular. Rate controlled. No murmurs. Lungs:    Symmetrical. Clear to auscultation bilaterally. No wheezes. No rhonchi. No rales. Abdomen:   Soft. Non-tender. Non-distended. Bowel sounds positive. No organomegaly or masses. Moderate distention--ascities. Extremities:    Peripheral pulses present. No peripheral edema. No ulcers. No cyanosis. No clubbing. Neurologic:    Alert x 3. No focal deficit. Cranial nerves grossly intact. No focal weakness. Psych:   Behavior is normal. Mood appears normal. Speech is not rapid and/or pressured. Musculoskeletal:   Spine ROM normal. Muscular strength intact. Gait not assessed. Integumentary:  No rashes  Skin normal color and texture.   Genitalia/Breast:  Deferred    Allergy:  No Known Allergies     Medication:  Scheduled Meds:   folic acid  2 mg Oral Daily    [START ON 5/11/2020] albumin human  50 g Intravenous Once    sodium chloride flush  10 mL Intravenous 2 times per day    pantoprazole  40 mg Oral QAM AC    lactulose  20 g Oral TID    spironolactone  50 mg Oral BID    rifaximin  550 mg Oral BID    sodium chloride flush  10 mL Intravenous 2 times per day    cefTRIAXone (ROCEPHIN) IV  1 g Intravenous Q24H    vitamin B-1  100 mg Oral Daily    guaiFENesin  600 mg Oral BID     Continuous Infusions:      Objective Data:  CBC:   Recent Labs     05/07/20  1517  05/08/20  0606 05/08/20  1345 05/09/20  0722   WBC 9.1  --  7.1  --  5.7   HGB 12.4*   < > 10.2* 9.9* 9.4*     --  95*  --  79*    < > = values in this interval not displayed. BMP:    Recent Labs     05/08/20  0606 05/08/20  1916 05/09/20  0722   * 131* 130*   K 4.0 3.9 3.5   CL 87* 87* 88*   CO2 28 30* 31*   BUN 13 11 8   CREATININE 0.5* 0.5* 0.4*   GLUCOSE 106* 184* 131*     CMP:    Lab Results   Component Value Date     05/09/2020    K 3.5 05/09/2020    K 5.6 05/07/2020    CL 88 05/09/2020    CO2 31 05/09/2020    BUN 8 05/09/2020    CREATININE 0.4 05/09/2020    GFRAA >60 05/09/2020    LABGLOM >60 05/09/2020    GLUCOSE 131 05/09/2020    PROT 6.7 05/09/2020    LABALBU 3.5 05/09/2020    CALCIUM 8.6 05/09/2020    BILITOT 5.8 05/09/2020    ALKPHOS 105 05/09/2020     05/09/2020    ALT 32 05/09/2020     Hepatic:   Recent Labs     05/07/20  1517 05/08/20  0606 05/09/20  0722   * 193* 165*   ALT 51* 39 32   BILITOT 4.8* 5.4* 5.8*   ALKPHOS 184* 143* 105     Troponin:   Recent Labs     05/07/20  1517   TROPONINI <0.01     BNP: No results for input(s): BNP in the last 72 hours. Lipids:   Recent Labs     05/08/20  0606   CHOL 100   HDL 7     ABGs: No results found for: PHART, PO2ART, UZN3UHC  INR:   Recent Labs     05/07/20  1517 05/08/20  0606 05/09/20  0722   INR 1.4 1.7 1.9   PROTIME 16.0* 19.5* 22.0*     RAD: Xr Chest Standard (2 Vw)    Result Date: 5/7/2020  EXAMINATION: ONE SUPINE XRAY VIEW(S) OF THE ABDOMEN; TWO XRAY VIEWS OF THE CHEST 5/7/2020 8:18 pm COMPARISON: None.  HISTORY: ORDERING SYSTEM PROVIDED HISTORY: distention lactic acidosis TECHNOLOGIST PROVIDED HISTORY: Reason for exam:->distention lactic acidosis FINDINGS: Chest-two views: There is free intraperitoneal air. The mediastinal and cardiac contours are normal.  The lungs are clear. There is no pleural effusion or pneumothorax identified. Abdomen: There are multiple abnormally dilated loops of small bowel within the left mid abdomen. There is a paucity of air-filled distal colon. 1. Free intraperitoneal air. 2. Multiple dilated loops of small bowel within the mid abdomen concerning for a bowel obstruction or an ileus. 3. No acute cardiopulmonary process identified. The above findings were discussed with Harlan at 8:40 p.m. on 05/07/2020. Xr Abdomen (kub) (single Ap View)    Result Date: 5/7/2020  EXAMINATION: ONE SUPINE XRAY VIEW(S) OF THE ABDOMEN; TWO XRAY VIEWS OF THE CHEST 5/7/2020 8:18 pm COMPARISON: None. HISTORY: ORDERING SYSTEM PROVIDED HISTORY: distention lactic acidosis TECHNOLOGIST PROVIDED HISTORY: Reason for exam:->distention lactic acidosis FINDINGS: Chest-two views: There is free intraperitoneal air. The mediastinal and cardiac contours are normal.  The lungs are clear. There is no pleural effusion or pneumothorax identified. Abdomen: There are multiple abnormally dilated loops of small bowel within the left mid abdomen. There is a paucity of air-filled distal colon. 1. Free intraperitoneal air. 2. Multiple dilated loops of small bowel within the mid abdomen concerning for a bowel obstruction or an ileus. 3. No acute cardiopulmonary process identified. The above findings were discussed with Harlan at 8:40 p.m. on 05/07/2020.      Ct Abdomen Pelvis W Iv Contrast Additional Contrast? None    Result Date: 5/8/2020  EXAMINATION: CT OF THE ABDOMEN AND PELVIS WITH CONTRAST 5/7/2020 10:12 pm TECHNIQUE: CT of the abdomen and pelvis was performed with the administration of intravenous contrast. Multiplanar reformatted images are provided for review. Dose modulation, iterative reconstruction, and/or weight based adjustment of the mA/kV was utilized to reduce the radiation dose to as low as reasonably achievable. COMPARISON: CT abdomen and pelvis 12/24/2006 HISTORY: ORDERING SYSTEM PROVIDED HISTORY: Free air post paracentesis, please call GI fellow if cannot fit in scanner TECHNOLOGIST PROVIDED HISTORY: Reason for exam:->Free air post paracentesis, please call GI fellow if cannot fit in scanner Additional Contrast?->None FINDINGS: LOWER CHEST Lung bases: Focal ground-glass abnormality in the middle lobe along the major fissure, probably outside the field of view on 2006 comparison. This measures 4.4 x 1.9 cm in axial plane. Normal included heart and pericardium. ABDOMEN Liver: Shrunken nodular liver, diffusely low in attenuation suggesting underlying infiltrative disease such as steatosis. A low-attenuation cystic focus has increased in size since 2006, now measuring 3.8 cm in greatest axial dimension. Gallbladder: Grossly unremarkable, not well evaluated. Biliary: No intra or extrahepatic bile duct dilatation. Pancreas: Normal. Spleen: Normal. Adrenals: Normal. Kidneys: Exophytic right renal simple cysts have increased in size. Kidneys are otherwise symmetric in size and parenchymal enhancement. A too small to characterize exophytic focus in the left mid polar region is noted. No hydronephrosis or nephrolithiasis. GI Tract: Normal distal esophagus, stomach and duodenal sweep. No apparent bowel wall thickening or obstruction. Peritoneum/Retroperitoneum: Large volume abdominal ascites with mesenteric edema and shotty reactive lymphadenopathy. Vascular: Normal caliber abdominal aorta and IVC. Portosystemic collaterals appreciated, including recanalized paraumbilical vein and prominent paraesophageal varices. PELVIS Genitourinary: Normal urinary bladder. Normal reproductive organs. Other: No free fluid.  No enlarged lymph nodes. MUSCULOSKELETAL Bones and Soft Tissues: Left inguinal canal contains ascitic fluid. Diffuse body wall anasarca. Portosystemic collaterals along the ventral abdomen. No acute osseous abnormality. T11 superior endplate wedge deformity appears chronic. No evidence of pneumoperitoneum following paracentesis. Large volume of abdominopelvic ascites remains. Cirrhosis with features of portal venous hypertension, including prominent portosystemic collaterals. A hepatic simple cyst has increased in size since 2006. New bubbly ground-glass opacity in the middle lobe along the fissure, outside the field of view on comparison exam.  This is indeterminate. Although this could represent focal infection/inflammatory focus, additional considerations would include organizing pneumonia or adenocarcinoma spectrum lesion. Recommend follow-up CT chest in 3 months. Us Abdomen Limited    Result Date: 5/7/2020  EXAMINATION: RIGHT UPPER QUADRANT ULTRASOUND 5/7/2020 10:20 pm COMPARISON: CT abdomen pelvis 05/07/2020 HISTORY: ORDERING SYSTEM PROVIDED HISTORY: Decompensated cirrhosis, r/o hepatoma TECHNOLOGIST PROVIDED HISTORY: Reason for exam:->Decompensated cirrhosis, r/o hepatoma FINDINGS: LIVER:  There is diffusely abnormal increased echotexture throughout the liver consistent with hepatocellular disease. Decreased through transmission of sound challenge evaluation of the hepatic parenchyma. There is a focal hypoattenuating lesion identified left hepatic lobe posteriorly with slight posterior acoustic enhancement suggestive of a cyst. BILIARY SYSTEM:  Gallbladder is not seen/visualized sonographically. RIGHT KIDNEY: No hydronephrosis PANCREAS:  Not visualized. OTHER: There is large amount ascites identified right upper quadrant, right lower quadrant and left lower quadrant. Large volume ascites.  Cirrhotic appearance of the liver with decreased through transmission of sound challenge evaluation for hepatic lesions. Nonvisualization of the gallbladder and pancreas due to body habitus and decreased through transmission of sound. Assessment:  1. Massive ascites due to severely decompensated alcoholic cirrhosis  2. Acute on chronic alcohol abuse without overt signs of alcohol withdrawal  3. Hyperammonemia without overt signs of hepatic encephalopathy  4. Macrocytic anemia due to alcohol abuse  5. Hyperbilirubinemia  6. Hypoosmolar, hypervolemic, hyponatremia  7. Non-oxygen dependent COPD with chronic respiratory failure  8. History of varices  9. Tobacco abuse  10. Severe outpatient noncompliance    Plan:   CIWA protocol will be discontinued today as the patient exhibits no outward signs of alcohol withdrawal.  Diuretic therapy is being employed, however the patient continues to retain abdominal ascites. Repeat paracentesis will be planned tomorrow with additional albumin administration. We again dressed the absolute need for alcohol cessation as an outpatient. We have asked the therapy teams to provide consultation and have encouraged the patient to become more ambulatory. Electrolyte and vitamin deficiencies will be addressed accordingly. Probable discharge home in the next 24 to 48 hours and possible consideration for weekly paracentesis. I reviewed the patient's past medical, surgical history and medication. Patient's medications were reviewed/continued/adjusted. Labs as ordered. Please see orders for further plan of care. Rhythm strips reviewed as well as consultant recommendations/notes and/or discussion. I reviewed the  course of events since last visit. More than 50% of my  time was spent at the bedside counseling and/or coordination of care with the patient and/or family with face to face contact. This time was spent reviewing notes and laboratory data, instructing and counseling the patient.  Time I spent with the family or surrogate(s) is included only if the patient was incapable of providing the necessary information or participating in medical decisionsI also discussed the differential diagnosis and all of the proposed management plans with the patient and individuals accompanying the patient. Maciej Rosales requires this high level of physician care and nursing in the Telemetry due the complexity of decision management and chance of rapid decline or death. Continued cardiac monitoring and higher level of nursing are required. I am ready available for decision making and intervention. I reviewed the relevant imaging studies and available reports. I also discussed the differential diagnosis and all of the proposed management plans with the patient and individuals accompanying the patient to this visit. I reviewed the relevant imaging studies and available reports.         Scott Mishra DO  On 5/10/2020  8:58 AM

## 2020-05-10 NOTE — CONSULTS
[facility] Nephrology Consult       Patient's Name:  Jd Berman  Date of Service:  5/10/2020  Requesting    No primary care provider on file.      Reason for Consult:             Hyponatremia     Chief Complaint:                   Abdominal distention     Information obtained from:  Patient and chart     History of Present Illness: 48 yrs old WM     Known Alcoholic cirrhosis , recurrent ascites , varices   Came in with massive distention of abdomen , had Ascitic tap > 5 liters   On arrival and further again , still distended abdomen and edematous   Continues to drink , issues with compliance in medications     Came in with low sodium of 126 , which subsequently improved and further more on fluid restriction now to 132     Also noted Jaundice , high AST , bilirubin , high Ammonia , los serum osmolality , normal cortisol and TSH and high INR       Past Medical History:   Diagnosis Date    [facility] [address]     Ascites     Cirrhosis [facility] [address]     Hypertension     Noncompliance          Past Surgical History:   Procedure Laterality Date    ABDOMEN SURGERY      ABDOMINAL EXPLORATION SURGERY  03/22/2014    Exploration abdominal wall wound, partial omentectomy,  umbilical hernia repair, 5100cc ascitic fluid removed    ENDOSCOPY, COLON, DIAGNOSTIC      PARACENTESIS  2017 5/4-10L; 5/8-10L; 6/15-10L    PARACENTESIS  2014    3/22-5L; 3/26-4L         Social History     Socioeconomic History    Marital status: Single     Spouse name: Not on file    Number of children: 0    Years of education: 15    Highest education level: Not on file   Occupational History    Not on file   Social Needs    Financial resource strain: Not on file    Food insecurity     Worry: Not on file     Inability: Not on file   [employer] needs     Medical: Not on file     Non-medical: Not on file   Tobacco Use    Smoking status: Current Every Day Smoker     Packs/day: 1.00     Years: 20.00     Pack years: 20.00     Types: Cigars    Smokeless tobacco: Current User     Types: Chew   Substance and Sexual Activity    Alcohol use:  Yes     Alcohol/week: 21.0 standard drinks     Types: 21 Glasses of wine per week     Comment: gets 5 liter wine box ehich can last 2 days    Drug use: No    Sexual activity: Never   Lifestyle    Physical activity     Days per week: Not on file     Minutes per session: Not on file    Stress: Not on file   Relationships    Social connections     Talks on phone: Not on file     Gets together: Not on file     Attends Mu-ism service: Not on file     Active member of club or organization: Not on file     Attends meetings of clubs or organizations: Not on file     Relationship status: Not on file    Intimate partner violence     Fear of current or ex partner: Not on file     Emotionally abused: Not on file     Physically abused: Not on file     Forced sexual activity: Not on file   Other Topics Concern    Not on file   Social History Narrative    Not on file       Family History  Family History   Problem Relation Age of Onset    Heart Disease Mother     Diabetes Father     Other Sister     High Blood Pressure Maternal Grandmother     High Blood Pressure Maternal Grandfather        Scheduled Meds:   folic acid  2 mg Oral Daily    [START ON 5/11/2020] albumin human  50 g Intravenous Once    furosemide  40 mg Oral BID    sodium chloride flush  10 mL Intravenous 2 times per day    pantoprazole  40 mg Oral QAM AC    lactulose  20 g Oral TID    spironolactone  50 mg Oral BID    rifaximin  550 mg Oral BID    sodium chloride flush  10 mL Intravenous 2 times per day    cefTRIAXone (ROCEPHIN) IV  1 g Intravenous Q24H    vitamin B-1  100 mg Oral Daily    guaiFENesin  600 mg Oral BID       Continuous Infusions:  [unfilled]    PRN meds  LORazepam, sodium chloride flush, perflutren lipid microspheres, acetaminophen, sodium chloride flush, albuterol    Allergies:  Patient has no known allergies. Review of Systems     Pertinent positives and negatives as in HPI. Other systems reviewed and were negative. LAST 3 CMP  Recent Labs     05/08/20  0606 05/08/20  1916 05/09/20  0722 05/10/20  0751   * 131* 130* 132   K 4.0 3.9 3.5 3.5   CL 87* 87* 88* 92*   CO2 28 30* 31* 28   BUN 13 11 8 8   CREATININE 0.5* 0.5* 0.4* 0.4*   GLUCOSE 106* 184* 131* 117*   CALCIUM 8.5* 8.7 8.6 8.5*   MG 1.7  --   --   --    PHOS 2.5  --   --   --    PROT 7.1  --  6.7 6.6   LABALBU 2.8*  --  3.5 3.4*   BILITOT 5.4*  --  5.8* 4.5*   ALKPHOS 143*  --  105 108   *  --  165* 161*       LAST 3 CBC:  Recent Labs     05/08/20  0606 05/08/20  1345 05/09/20  0722 05/10/20  0751   WBC 7.1  --  5.7 6.8   RBC 2.63*  --  2.45* 2.65*   HGB 10.2* 9.9* 9.4* 10.0*   HCT 28.1* 28.5* 26.7* 28.6*   PLT 95*  --  79* 91*         Intake/Output Summary (Last 24 hours) at 5/10/2020 1409  Last data filed at 5/10/2020 1346  Gross per 24 hour   Intake 640 ml   Output --   Net 640 ml     Patient Vitals for the past 24 hrs:   BP Temp Temp src Pulse Resp SpO2 Weight   05/10/20 0815 (!) 102/52 98.2 °F (36.8 °C) Oral 80 18 93 % --   05/10/20 0600 -- -- -- -- -- -- 247 lb 11.2 oz (112.4 kg)   05/09/20 1600 (!) 109/58 98.1 °F (36.7 °C) Oral 79 17 94 % --       General appearance:  Appears stated age  Skin: jaundice   Neck: supple, no masses, no JVD, No carotid bruits, No thyromegaly  Lungs: respirations unlabored. Clear to auscultation. No rales, wheezes, no rhonchi. Equal chest excursion with respirations. Diminished air entry bases   Heart RRR. pmi not laterally displaced. No S3 or S4, no rub  Abdomen:  Soft, ND, NT. Bowel sounds present. No HSM. No epigastric bruit, no increased Ao pulsation,  Extremities: warm to touch. LE edema +++. No fem bruit. 2+ upstrokes and equal bilaterally. PT/Pedal 2+ equal bilat  Neuro: Cr N 2-12 grossly intact. No focal motor neuro deficit. No alteration in recent remote memory.     Assessment/Plan Hypervolemic Hyponatremia in the setting of Cirrhotic                                 Liver disease , portal HTN and significant fluid overload                                 Na better on fluid restriction                                    Alcoholic hepatitis with hepatic encephalopathy , underlying                                  Cirrhosis with portal HTN , Varices , Ascites , coagulopathy                                  And hypoproteinemia  And thrombocytopenia                                     HTN controlled        Hyponatremia improving , Tolvaptan not a good choice for diuresis as    Hepatotoxic , more aggressive diuretics may be tried once Ammonia levels    improve and he is more stable , continue present care , and Abdominal    fluid paracentesis .                                       Thank you for allowing us to participate in the care of [address]  5/10/2020  2:09 PM

## 2020-05-10 NOTE — PROGRESS NOTES
PROGRESS NOTE    By Harrison Jean D.O., GI Fellow    [facility] and Qing Rouse M.D., Dr. Mandy Montez M.D., Dr. Ale Gordon, Dr. Beth Yeung M.D., Dr. Nanette Luciano  48 y.o.  male    SUBJECTIVE:  No new complaints this morning. Had BMs last night with lactulose  Tolerating diet    OBJECTIVE:  BP (!) 102/52   Pulse 80   Temp 98.2 °F (36.8 °C) (Oral)   Resp 18   Ht 5' 10\" (1.778 m)   Wt 247 lb 11.2 oz (112.4 kg)   SpO2 93%   BMI 35.54 kg/m²   GEN: A&Ox3, friendly, NAD  HEENT:PEERL, + icterus  Heart: RRR  Lungs: CTAB  Abd.: soft, NT, +D, BS +, no G/R  Extr.: no C/C/E, no brusing      Stool (measured) : 0 mL  Lab Results   Component Value Date    WBC 6.8 05/10/2020    WBC 5.7 05/09/2020    WBC 7.1 05/08/2020    HGB 10.0 05/10/2020    HGB 9.4 05/09/2020    HGB 9.9 05/08/2020    HCT 28.6 05/10/2020    .9 05/10/2020    RDW 18.1 05/10/2020    PLT 91 05/10/2020    PLT 79 05/09/2020    PLT 95 05/08/2020     Lab Results   Component Value Date     05/10/2020    K 3.5 05/10/2020    K 5.6 05/07/2020    CL 92 05/10/2020    CO2 28 05/10/2020    BUN 8 05/10/2020    CREATININE 0.4 05/10/2020    CALCIUM 8.5 05/10/2020    PROT 6.6 05/10/2020    LABALBU 3.4 05/10/2020    BILITOT 4.5 05/10/2020    BILITOT 5.8 05/09/2020    BILITOT 5.4 05/08/2020    ALKPHOS 108 05/10/2020    ALKPHOS 105 05/09/2020    ALKPHOS 143 05/08/2020     05/10/2020     05/09/2020     05/08/2020    ALT 31 05/10/2020    ALT 32 05/09/2020    ALT 39 05/08/2020     Lab Results   Component Value Date    LIPASE 46 05/07/2020    LIPASE 47 06/14/2017    LIPASE 125 05/05/2017     No results found for: AMYLASE      ASSESSMENT/PLAN:  1. Decompensated Cirrhosis (ascites) / Elevated liver chemistries mixed pattern - ? Alcoholic Hepatitis  - last ETOH ingestion 5/7 - withdrawal management per admitting  - AST/ALT elevated in typical 2:1 ration   - MDF 38.1 Trental considered, likely of no benefit, refraining from prednisolone due to risk/questionable infection  - Discussed Alcohol cessation in more detail, pt living with his parents currently, he thinks they will be supportive  - RUQ negative for hepatoma mass, AFP - negative  - Grade 0-1 HE on exam start on Lactulose 20mg BID titrate to 2-3 soft bowel movements daily   - EGD 5/8/20 with [facility] - hx of small varices 2017 - recommend yearly EGD- Will hold Nadolol, given decompensation, pt to follow in office with EGDs for primary prophylaxis. Can have further discussion of Nadolol as outpt  - Iron studies could support hemo chrom however likely Alcohol induced elevation - will order HFE gene testing as outpt  - Negative Hep panel, need Hep B vaccine as outpt  - AI work-up negative in the past  - Paracentesis - 5/8/20 - massive ascites, ~20L removed this admission - elevated SAAG, low protein (1.4), PMN<250 - Pt likely to benefit from SBP prophylaxis upon d/c given low AFTP. - Plan for repeat paracentesis tomorrow. - Pt ok to transition to oral cipro tomorrow. - Aldactone provided, nephrology consult for diuretic management/hyponatremia  - Pt counseled about 2gm NA diet and need from compliance   - Pt not exemption pathway LT candidate, continue to monitor for GENARO.      2. Acute on Chronic Macrocytic Anemia - likely   - Hgb 10.6 at baseline   - B12 folate iron studies and TIBC ordered      3. Hyponatremia   - per admitting   - maybe due to delusional effect with massive ascites  - trend with daily CMP     Pt d/w with Dr. Judy Mora, DO  5/10/2020  9:49 AM    Agree with above. Pt with cirrhosis and ascites. Na improved this am.  Appreciate nephrology input. Hope for patient to go home on at least lasix 40 and aldactone 100 and try to titrate from their based on ascitic fluid accumulation and electrolytes and renal function. See orders. Pt will need close outpt F/U.     Kaitlin Hernandez D.O.  5/10/2020

## 2020-05-10 NOTE — PLAN OF CARE
Problem: Pain:  Goal: Pain level will decrease  Description: Pain level will decrease  5/10/2020 1136 by Mateo Reed RN  Outcome: Met This Shift     Problem: Pain:  Goal: Control of acute pain  Description: Control of acute pain  5/10/2020 1136 by Mateo Reed RN  Outcome: Met This Shift     Problem: Pain:  Goal: Control of chronic pain  Description: Control of chronic pain  5/10/2020 1136 by Mateo Reed RN  Outcome: Met This Shift

## 2020-05-10 NOTE — PROGRESS NOTES
ascites    Vitals:  Blood Pressure at rest  Blood Pressure post session    Heart Rate at rest  Heart Rate post activity    SPO2 at rest %  SPO2 post activity % recovery      Patient education  Patient educated on role of Physical Therapy, risks of immobility and plan of care, ankle pumps, quad set and glut set for edema control, blood clot prevention, energy conservation and safety      Patient response to education:   Pt verbalized understanding Pt demonstrated skill Pt requires further education in this area   Yes Partial Yes       ASSESSMENT:    Comments:  Pt able to progress with increased distance with no LOB using a wheeled walker. Pt able to perform exercises with AROM. Treatment:  Patient practiced and was instructed in the following treatment:  Pt sitting on the edge of the bed at start of tx session. Pt stood, ambulated in the hallway and back to the chair. Pt performed seated exercises. Therapeutic Exercises: ankle pumps, hip abduction/adduction, long arc quad and seated marching  x 15 - 20 reps. V/C for technique. At end of session, patient in chair with  call light and phone within reach,  all lines and tubes intact, nursing notified. Patient would benefit from continued skilled Physical Therapy to improve functional independence and quality of life. PLAN:    Patient is making fair progress towards established goals. Will continue with current Plan of care. Time in  10:22  Time out  10:45    Total Treatment Time  23 minutes    CPT codes:  Therapeutic activities (84113)   13 minutes  1 unit(s)  Therapeutic exercises (83329)   10 minutes  1 unit(s)    Tahir Tee  [facility]  LIC # [id number]

## 2020-05-11 ENCOUNTER — APPOINTMENT (OUTPATIENT)
Dept: ULTRASOUND IMAGING | Age: 55
DRG: 433 | End: 2020-05-11
Payer: COMMERCIAL

## 2020-05-11 LAB
ALBUMIN SERPL-MCNC: 3.1 G/DL (ref 3.5–5.2)
ALP BLD-CCNC: 105 U/L (ref 40–129)
ALT SERPL-CCNC: 31 U/L (ref 0–40)
AMMONIA: 68 UMOL/L (ref 16–60)
ANION GAP SERPL CALCULATED.3IONS-SCNC: 12 MMOL/L (ref 7–16)
AST SERPL-CCNC: 141 U/L (ref 0–39)
BASOPHILS ABSOLUTE: 0.07 E9/L (ref 0–0.2)
BASOPHILS RELATIVE PERCENT: 1 % (ref 0–2)
BILIRUB SERPL-MCNC: 3.6 MG/DL (ref 0–1.2)
BUN BLDV-MCNC: 8 MG/DL (ref 6–20)
CALCIUM SERPL-MCNC: 8.2 MG/DL (ref 8.6–10.2)
CHLORIDE BLD-SCNC: 92 MMOL/L (ref 98–107)
CO2: 29 MMOL/L (ref 22–29)
CREAT SERPL-MCNC: 0.5 MG/DL (ref 0.7–1.2)
EOSINOPHILS ABSOLUTE: 0.37 E9/L (ref 0.05–0.5)
EOSINOPHILS RELATIVE PERCENT: 5 % (ref 0–6)
GFR AFRICAN AMERICAN: >60
GFR NON-AFRICAN AMERICAN: >60 ML/MIN/1.73
GLUCOSE BLD-MCNC: 111 MG/DL (ref 74–99)
HCT VFR BLD CALC: 29.1 % (ref 37–54)
HEMOGLOBIN: 10.7 G/DL (ref 12.5–16.5)
HYPOCHROMIA: ABNORMAL
INR BLD: 2.1
LYMPHOCYTES ABSOLUTE: 0.8 E9/L (ref 1.5–4)
LYMPHOCYTES RELATIVE PERCENT: 11 % (ref 20–42)
MCH RBC QN AUTO: 39.6 PG (ref 26–35)
MCHC RBC AUTO-ENTMCNC: 36.8 % (ref 32–34.5)
MCV RBC AUTO: 107.8 FL (ref 80–99.9)
MONOCYTES ABSOLUTE: 0.8 E9/L (ref 0.1–0.95)
MONOCYTES RELATIVE PERCENT: 11 % (ref 2–12)
NEUTROPHILS ABSOLUTE: 5.26 E9/L (ref 1.8–7.3)
NEUTROPHILS RELATIVE PERCENT: 72 % (ref 43–80)
PDW BLD-RTO: 18.4 FL (ref 11.5–15)
PLATELET # BLD: 96 E9/L (ref 130–450)
PLATELET CONFIRMATION: NORMAL
PMV BLD AUTO: 10.7 FL (ref 7–12)
POIKILOCYTES: ABNORMAL
POLYCHROMASIA: ABNORMAL
POTASSIUM SERPL-SCNC: 3.5 MMOL/L (ref 3.5–5)
PROTHROMBIN TIME: 24.2 SEC (ref 9.3–12.4)
RBC # BLD: 2.7 E12/L (ref 3.8–5.8)
SODIUM BLD-SCNC: 133 MMOL/L (ref 132–146)
TARGET CELLS: ABNORMAL
TOTAL PROTEIN: 6.6 G/DL (ref 6.4–8.3)
WBC # BLD: 7.3 E9/L (ref 4.5–11.5)

## 2020-05-11 PROCEDURE — 85025 COMPLETE CBC W/AUTO DIFF WBC: CPT

## 2020-05-11 PROCEDURE — 6370000000 HC RX 637 (ALT 250 FOR IP): Performed by: INTERNAL MEDICINE

## 2020-05-11 PROCEDURE — 6370000000 HC RX 637 (ALT 250 FOR IP): Performed by: STUDENT IN AN ORGANIZED HEALTH CARE EDUCATION/TRAINING PROGRAM

## 2020-05-11 PROCEDURE — 82140 ASSAY OF AMMONIA: CPT

## 2020-05-11 PROCEDURE — 6360000002 HC RX W HCPCS: Performed by: INTERNAL MEDICINE

## 2020-05-11 PROCEDURE — 80053 COMPREHEN METABOLIC PANEL: CPT

## 2020-05-11 PROCEDURE — 36415 COLL VENOUS BLD VENIPUNCTURE: CPT

## 2020-05-11 PROCEDURE — 2580000003 HC RX 258: Performed by: INTERNAL MEDICINE

## 2020-05-11 PROCEDURE — 2580000003 HC RX 258: Performed by: NURSE PRACTITIONER

## 2020-05-11 PROCEDURE — 85610 PROTHROMBIN TIME: CPT

## 2020-05-11 PROCEDURE — 97530 THERAPEUTIC ACTIVITIES: CPT

## 2020-05-11 PROCEDURE — P9047 ALBUMIN (HUMAN), 25%, 50ML: HCPCS | Performed by: INTERNAL MEDICINE

## 2020-05-11 PROCEDURE — C1729 CATH, DRAINAGE: HCPCS

## 2020-05-11 PROCEDURE — 1200000000 HC SEMI PRIVATE

## 2020-05-11 PROCEDURE — 0W9G3ZZ DRAINAGE OF PERITONEAL CAVITY, PERCUTANEOUS APPROACH: ICD-10-PCS | Performed by: RADIOLOGY

## 2020-05-11 RX ORDER — ACETAMINOPHEN 325 MG/1
650 TABLET ORAL EVERY 4 HOURS PRN
Status: DISCONTINUED | OUTPATIENT
Start: 2020-05-11 | End: 2020-05-11 | Stop reason: SDUPTHER

## 2020-05-11 RX ADMIN — RIFAXIMIN 550 MG: 550 TABLET ORAL at 07:51

## 2020-05-11 RX ADMIN — WATER 1 G: 1 INJECTION INTRAMUSCULAR; INTRAVENOUS; SUBCUTANEOUS at 20:55

## 2020-05-11 RX ADMIN — PANTOPRAZOLE SODIUM 40 MG: 40 TABLET, DELAYED RELEASE ORAL at 05:33

## 2020-05-11 RX ADMIN — Medication 10 ML: at 20:57

## 2020-05-11 RX ADMIN — SPIRONOLACTONE 50 MG: 25 TABLET ORAL at 07:52

## 2020-05-11 RX ADMIN — SODIUM CHLORIDE, PRESERVATIVE FREE 10 ML: 5 INJECTION INTRAVENOUS at 20:56

## 2020-05-11 RX ADMIN — FOLIC ACID 2 MG: 1 TABLET ORAL at 07:51

## 2020-05-11 RX ADMIN — Medication 100 MG: at 07:52

## 2020-05-11 RX ADMIN — RIFAXIMIN 550 MG: 550 TABLET ORAL at 20:56

## 2020-05-11 RX ADMIN — ALBUMIN (HUMAN) 50 G: 0.25 INJECTION, SOLUTION INTRAVENOUS at 09:27

## 2020-05-11 RX ADMIN — LACTULOSE 20 G: 20 SOLUTION ORAL at 13:30

## 2020-05-11 RX ADMIN — GUAIFENESIN 600 MG: 600 TABLET, EXTENDED RELEASE ORAL at 07:52

## 2020-05-11 RX ADMIN — LACTULOSE 20 G: 20 SOLUTION ORAL at 07:51

## 2020-05-11 RX ADMIN — GUAIFENESIN 600 MG: 600 TABLET, EXTENDED RELEASE ORAL at 20:56

## 2020-05-11 RX ADMIN — LACTULOSE 20 G: 20 SOLUTION ORAL at 20:56

## 2020-05-11 RX ADMIN — FUROSEMIDE 40 MG: 40 TABLET ORAL at 18:09

## 2020-05-11 RX ADMIN — SPIRONOLACTONE 50 MG: 25 TABLET ORAL at 18:09

## 2020-05-11 RX ADMIN — FUROSEMIDE 40 MG: 40 TABLET ORAL at 07:52

## 2020-05-11 RX ADMIN — SODIUM CHLORIDE, PRESERVATIVE FREE 10 ML: 5 INJECTION INTRAVENOUS at 07:53

## 2020-05-11 ASSESSMENT — PAIN SCALES - GENERAL: PAINLEVEL_OUTOF10: 0

## 2020-05-11 NOTE — PROGRESS NOTES
Physical Therapy Treatment Note    Room #:  0421/0421-02  Patient Name: Adrián Le  YOB: 1965  MRN: [id number]    Referring Provider: Ronit Govea DO    Date of Service: 5/11/2020    Evaluating Physical Therapist:  Macho Marie, PT #[id number]      Diagnosis:   Ascites [R18.8]        Patient Active Problem List   Diagnosis    Evisceration of bowel    Decompensated alcoholic cirrhosis ([facility] [address]    Ascites    Hyponatremia        Tentative placement recommendation: Home Health Physical Therapy     Equipment recommendation: Patient has needed equipment       Prior Level of Function: Patient ambulated independently    Rehab Potential: good  for baseline    Past medical history:   Past Medical History:   Diagnosis Date    Alcoholism ([facility] [address]     Ascites     Cirrhosis ([facility] [address] [facility]     Hypertension     Noncompliance      Past Surgical History:   Procedure Laterality Date    ABDOMEN SURGERY      ABDOMINAL EXPLORATION SURGERY  03/22/2014    Exploration abdominal wall wound, partial omentectomy,  umbilical hernia repair, 5100cc ascitic fluid removed    ENDOSCOPY, COLON, DIAGNOSTIC      PARACENTESIS  2017 5/4-10L; 5/8-10L; 6/15-10L    PARACENTESIS  2014    3/22-5L; 3/26-4L    UPPER GASTROINTESTINAL ENDOSCOPY N/A 5/8/2020    EGD ESOPHAGOGASTRODUODENOSCOPY performed by Lisa Camargo DO at [facility] ENDOSCOPY       Precautions: falls and alarm ,  ascites    SUBJECTIVE:    Social history: Patient lives with parents  in a ranch home  with 3 steps  to enter with [employer] in shower grab bars left handed, drives, glasses    Equipment owned: Salom[place] Lugo,       [address]   How much difficulty turning over in bed?: None  How much difficulty sitting down on / standing up from a chair with arms?: A Little  How much difficulty moving from lying on back to sitting on side of bed?: None  How much help from another person moving to and from a bed to a chair?: A Little  How much help from another person needed to walk in hospital room?: A Little  How much help from another person for climbing 3-5 steps with a railing?: A Little  AM-PAC Inpatient Mobility Raw Score : 20  AM-PAC Inpatient T-Scale Score : 47.67  Mobility Inpatient CMS 0-100% Score: 35.83  Mobility Inpatient CMS G-Code Modifier : [address] cleared patient for PT treatment. OBJECTIVE:   Initial Evaluation  Date: 5/8/2020 Treatment Date:  5/11/2020       Short Term/ Long Term   Goals   Was pt agreeable to Eval/treatment? Yes   yes To be met in 3 days   Pain level   4/10  lower extremities  0/10    Bed Mobility    Rolling: Minimal assist of 1    Supine to sit: Minimal assist of 1    Sit to supine: Minimal assist of 1    Scooting: Minimal assist of 1   Rolling: Independent   Supine to sit: Independent   Sit to supine: Independent   Scooting: Independent    Rolling: Independent    Supine to sit: Independent    Sit to supine: Independent    Scooting: Independent     Transfers Sit to stand: Minimal assist of 1   Sit to stand: Supervision        Sit to stand: Independent     Ambulation    2 x 90 feet using  IV pole with Moderate assist of 1   patient reaching for railing with opposite hand, Loss of balance  X 2 with moderate assist to recover 130 feet using  wheeled walker with Supervision- V/C for upright posture, pacing and safety. 130 feet without AD with supervision. Patient unsteady as he fatigued but no LOB. Discussed using [employer] at home until his strength and endurance improves.     100 feet using  no device with Independent versus with cane modified independent   Stair negotiation: ascended and descended   Not assessed    10 steps with 1 rail supervision   3 steps with rail with supervision     ROM Within functional limits         Strength BUE: refer to OT eval  RLE:  3+/5  LLE:  3+/5   Increase strength in affected mm groups by 1/3 grade   Balance Sitting EOB:  good -  Dynamic Standing:  fair - Sitting EOB:  good   Dynamic Standing:  fair  wheeled walker   Sitting EOB:  good    Dynamic Standing: good -     Patient is Alert & Oriented x person, place, time and situation  and follows directions    Sensation:  Pt denies numbness and tingling to extremities  Edema:  yes bilateral lower extremities  And ascites    Vitals:  Blood Pressure at rest  Blood Pressure post session    Heart Rate at rest  Heart Rate post activity    SPO2 at rest %  SPO2 post activity % recovery      Patient education  Patient educated on role of Physical Therapy, risks of immobility and plan of care, ankle pumps, quad set and glut set for edema control, blood clot prevention, energy conservation and safety      Patient response to education:   Pt verbalized understanding Pt demonstrated skill Pt requires further education in this area   Yes Partial Yes       ASSESSMENT:    Comments:  Pt able to progress with increased distance with no LOB using a wheeled walker. Pt able to perform exercises with AROM. Treatment:  Patient practiced and was instructed in the following treatment:  Pt  ambulated in the hallway, performed stairs   And ambulated back to room, RTB. At end of session, patient in bed with  call light and phone within reach,  all lines and tubes intact, nursing notified. His phone rang and he was left in bed talking to family. Patient would benefit from continued skilled Physical Therapy to improve functional independence and quality of life. PLAN:    Patient is making fair progress towards established goals. Will continue with current Plan of care. Time in  1515  Time out  1538    Total Treatment Time  23 minutes    CPT codes:  Therapeutic activities (76060)   23 minutes  2 unit(s)    Celina Console, PTA  Lic.  #[id number]

## 2020-05-11 NOTE — PROGRESS NOTES
Physical Therapy - Patient UA    5/11/2020    MRN: [id number]  Glena Brain      Patient out of room for test.  Will continue to follow.     Ramakrishna Garcia PTA

## 2020-05-11 NOTE — PLAN OF CARE
Problem: Falls - Risk of:  Goal: Will remain free from falls  Description: Will remain free from falls  5/11/2020 0054 by Tony Wood RN  Outcome: Met This Shift     Problem: Falls - Risk of:  Goal: Absence of physical injury  Description: Absence of physical injury  5/11/2020 0054 by Tony Wood RN  Outcome: Met This Shift

## 2020-05-11 NOTE — PROGRESS NOTES
Internal Medicine Progress Note    Brii Lugo. Janice Wick., & [address] [facility] [address] Janice Wick., F.A.C.O.I. Martha Amor D.O., F.A.C.O.I. Primary Care Physician: No primary care provider on file. Admitting Physician:  Ajay Cruz DO  Admission date and time: 5/7/2020  2:16 PM    Room:  [address] [place] [address]  Admitting diagnosis: Ascites [R18.8]    Patient Name: Joshua Osullivan  MRN: [id number]    Date of Service: 5/11/2020     Subjective:  Brayden Ortega is a 48 y. o.  male who was seen and examined today,5/11/2020, at the bedside. Brayden Ortega continues to improve from a functional standpoint. Unfortunately, he has developed increasing and progressive abdominal ascites once again. Plans are for paracentesis today. His diarrhea has improved and we discussed the need for approximately 5 bowel movements on a daily basis. He voiced understanding and agreement. We again stressed the importance of alcohol cessation as an outpatient along with fluid and salt restriction. Review of System:   Constitutional:   Denies fever or chills, weight loss or gain, fatigue or malaise. HEENT:   Denies ear pain, sore throat, sinus or eye problems. Cardiovascular:   Denies any chest pain, irregular heartbeats, or palpitations. Respiratory:   Denies shortness of breath, coughing, sputum production, hemoptysis, or wheezing. Gastrointestinal:   Increasing abdominal distention. No overt nausea or vomiting. He is having adequate bowel movements. Genitourinary:    Denies any urgency, frequency, hematuria. Voiding  without difficulty. Extremities:   Denies lower extremity swelling, edema or cyanosis. Neurology:    Denies any headache or focal neurological deficits, Denies generalized weakness or memory difficulty. Psch:   Denies being anxious or depressed. Musculoskeletal:    Denies  myalgias, joint complaints or back pain. Integumentary:   Denies any rashes, ulcers, or excoriations.   Denies bruising. Hematologic/Lymphatic:  Denies bruising or bleeding. Physical Exam:  No intake/output data recorded. Intake/Output Summary (Last 24 hours) at 5/11/2020 0837  Last data filed at 5/11/2020 0029  Gross per 24 hour   Intake 200 ml   Output 550 ml   Net -350 ml   I/O last 3 completed shifts: In: 200 [P.O.:200]  Out: 550 [Urine:550]  Patient Vitals for the past 96 hrs (Last 3 readings):   Weight   05/11/20 0615 244 lb 6.4 oz (110.9 kg)   05/10/20 0600 247 lb 11.2 oz (112.4 kg)   05/09/20 0616 278 lb (126.1 kg)       Vital Signs:   Blood pressure (!) 106/58, pulse 81, temperature 98.4 °F (36.9 °C), temperature source Oral, resp. rate 20, height 5' 10\" (1.778 m), weight 244 lb 6.4 oz (110.9 kg), SpO2 93 %. Yuly George is a 48 y. o.  male who is alert, responsive, oriented to person, place, and time. General appearance:   Well preserved, alert, no distress. Head:  Normocephalic. No masses, lesions or tenderness. Eyes:  PERRLA. EOMI. Sclera clear. Buccal mucosa moist.  ENT:  Ears normal. Mucosa normal.  Neck:    Supple. Trachea midline. No thyromegaly. No JVD. No bruits. Heart:    Rhythm regular. Rate controlled. No murmurs. Lungs:    Symmetrical. Clear to auscultation bilaterally. No wheezes. No rhonchi. No rales. Abdomen:   Markedly distended with severe ascites. Mild tenderness to palpation. Mild voluntary guarding. Extremities:    Peripheral pulses present. No peripheral edema. No ulcers. No cyanosis. No clubbing. Neurologic:    Alert x 3. No focal deficit. Cranial nerves grossly intact. No focal weakness. Psych:   Behavior is normal. Mood appears normal. Speech is not rapid and/or pressured. Musculoskeletal:   Spine ROM normal. Muscular strength intact. Gait not assessed. Integumentary:  No rashes  Skin normal color and texture.   Genitalia/Breast:  Deferred    Allergy:  No Known Allergies     Medication:  Scheduled Meds:   folic acid  2 mg Oral Daily    albumin human  50 g Intravenous Once    furosemide  40 mg Oral BID    sodium chloride flush  10 mL Intravenous 2 times per day    pantoprazole  40 mg Oral QAM AC    lactulose  20 g Oral TID    spironolactone  50 mg Oral BID    rifaximin  550 mg Oral BID    sodium chloride flush  10 mL Intravenous 2 times per day    cefTRIAXone (ROCEPHIN) IV  1 g Intravenous Q24H    vitamin B-1  100 mg Oral Daily    guaiFENesin  600 mg Oral BID       Objective Data:  CBC:   Recent Labs     05/09/20  0722 05/10/20  0751 05/11/20  0600   WBC 5.7 6.8 7.3   HGB 9.4* 10.0* 10.7*   PLT 79* 91* 96*     BMP:    Recent Labs     05/09/20  0722 05/10/20  0751 05/11/20  0600   * 132 133   K 3.5 3.5 3.5   CL 88* 92* 92*   CO2 31* 28 29   BUN 8 8 8   CREATININE 0.4* 0.4* 0.5*   GLUCOSE 131* 117* 111*     CMP:    Lab Results   Component Value Date     05/11/2020    K 3.5 05/11/2020    K 5.6 05/07/2020    CL 92 05/11/2020    CO2 29 05/11/2020    BUN 8 05/11/2020    CREATININE 0.5 05/11/2020    GFRAA >60 05/11/2020    LABGLOM >60 05/11/2020    GLUCOSE 111 05/11/2020    PROT 6.6 05/11/2020    LABALBU 3.1 05/11/2020    CALCIUM 8.2 05/11/2020    BILITOT 3.6 05/11/2020    ALKPHOS 105 05/11/2020     05/11/2020    ALT 31 05/11/2020     Hepatic:   Recent Labs     05/09/20  0722 05/10/20  0751 05/11/20  0600   * 161* 141*   ALT 32 31 31   BILITOT 5.8* 4.5* 3.6*   ALKPHOS 105 108 105     Troponin:   No results for input(s): TROPONINI in the last 72 hours. BNP: No results for input(s): BNP in the last 72 hours. Lipids:   No results for input(s): CHOL, HDL in the last 72 hours. Invalid input(s): LDLCALCU  ABGs: No results found for: PHART, PO2ART, MLV9RUW  INR:   Recent Labs     05/09/20  0722 05/10/20  0751 05/11/20  0600   INR 1.9 2.2 2.1   PROTIME 22.0* 24.9* 24.2*     RAD: Xr Chest Standard (2 Vw)    Result Date: 5/7/2020  EXAMINATION: ONE SUPINE XRAY VIEW(S) OF THE ABDOMEN; TWO XRAY VIEWS OF THE CHEST 5/7/2020 8:18 pm COMPARISON: None. HISTORY: ORDERING SYSTEM PROVIDED HISTORY: distention lactic acidosis TECHNOLOGIST PROVIDED HISTORY: Reason for exam:->distention lactic acidosis FINDINGS: Chest-two views: There is free intraperitoneal air. The mediastinal and cardiac contours are normal.  The lungs are clear. There is no pleural effusion or pneumothorax identified. Abdomen: There are multiple abnormally dilated loops of small bowel within the left mid abdomen. There is a paucity of air-filled distal colon. 1. Free intraperitoneal air. 2. Multiple dilated loops of small bowel within the mid abdomen concerning for a bowel obstruction or an ileus. 3. No acute cardiopulmonary process identified. The above findings were discussed with Serjuarez at 8:40 p.m. on 05/07/2020. Xr Abdomen (kub) (single Ap View)    Result Date: 5/7/2020  EXAMINATION: ONE SUPINE XRAY VIEW(S) OF THE ABDOMEN; TWO XRAY VIEWS OF THE CHEST 5/7/2020 8:18 pm COMPARISON: None. HISTORY: ORDERING SYSTEM PROVIDED HISTORY: distention lactic acidosis TECHNOLOGIST PROVIDED HISTORY: Reason for exam:->distention lactic acidosis FINDINGS: Chest-two views: There is free intraperitoneal air. The mediastinal and cardiac contours are normal.  The lungs are clear. There is no pleural effusion or pneumothorax identified. Abdomen: There are multiple abnormally dilated loops of small bowel within the left mid abdomen. There is a paucity of air-filled distal colon. 1. Free intraperitoneal air. 2. Multiple dilated loops of small bowel within the mid abdomen concerning for a bowel obstruction or an ileus. 3. No acute cardiopulmonary process identified. The above findings were discussed with Harlan at 8:40 p.m. on 05/07/2020.      Ct Abdomen Pelvis W Iv Contrast Additional Contrast? None    Result Date: 5/8/2020  EXAMINATION: CT OF THE ABDOMEN AND PELVIS WITH CONTRAST 5/7/2020 10:12 pm TECHNIQUE: CT of the abdomen and pelvis was performed with the administration of intravenous contrast. Multiplanar reformatted images are provided for review. Dose modulation, iterative reconstruction, and/or weight based adjustment of the mA/kV was utilized to reduce the radiation dose to as low as reasonably achievable. COMPARISON: CT abdomen and pelvis 12/24/2006 HISTORY: ORDERING SYSTEM PROVIDED HISTORY: Free air post paracentesis, please call GI fellow if cannot fit in scanner TECHNOLOGIST PROVIDED HISTORY: Reason for exam:->Free air post paracentesis, please call GI fellow if cannot fit in scanner Additional Contrast?->None FINDINGS: LOWER CHEST Lung bases: Focal ground-glass abnormality in the middle lobe along the major fissure, probably outside the field of view on 2006 comparison. This measures 4.4 x 1.9 cm in axial plane. Normal included heart and pericardium. ABDOMEN Liver: Shrunken nodular liver, diffusely low in attenuation suggesting underlying infiltrative disease such as steatosis. A low-attenuation cystic focus has increased in size since 2006, now measuring 3.8 cm in greatest axial dimension. Gallbladder: Grossly unremarkable, not well evaluated. Biliary: No intra or extrahepatic bile duct dilatation. Pancreas: Normal. Spleen: Normal. Adrenals: Normal. Kidneys: Exophytic right renal simple cysts have increased in size. Kidneys are otherwise symmetric in size and parenchymal enhancement. A too small to characterize exophytic focus in the left mid polar region is noted. No hydronephrosis or nephrolithiasis. GI Tract: Normal distal esophagus, stomach and duodenal sweep. No apparent bowel wall thickening or obstruction. Peritoneum/Retroperitoneum: Large volume abdominal ascites with mesenteric edema and shotty reactive lymphadenopathy. Vascular: Normal caliber abdominal aorta and IVC. Portosystemic collaterals appreciated, including recanalized paraumbilical vein and prominent paraesophageal varices. PELVIS Genitourinary: Normal urinary bladder. Normal reproductive organs. Other: No free fluid. No enlarged lymph nodes. MUSCULOSKELETAL Bones and Soft Tissues: Left inguinal canal contains ascitic fluid. Diffuse body wall anasarca. Portosystemic collaterals along the ventral abdomen. No acute osseous abnormality. T11 superior endplate wedge deformity appears chronic. No evidence of pneumoperitoneum following paracentesis. Large volume of abdominopelvic ascites remains. Cirrhosis with features of portal venous hypertension, including prominent portosystemic collaterals. A hepatic simple cyst has increased in size since 2006. New bubbly ground-glass opacity in the middle lobe along the fissure, outside the field of view on comparison exam.  This is indeterminate. Although this could represent focal infection/inflammatory focus, additional considerations would include organizing pneumonia or adenocarcinoma spectrum lesion. Recommend follow-up CT chest in 3 months. Us Abdomen Limited    Result Date: 5/7/2020  EXAMINATION: RIGHT UPPER QUADRANT ULTRASOUND 5/7/2020 10:20 pm COMPARISON: CT abdomen pelvis 05/07/2020 HISTORY: ORDERING SYSTEM PROVIDED HISTORY: Decompensated cirrhosis, r/o hepatoma TECHNOLOGIST PROVIDED HISTORY: Reason for exam:->Decompensated cirrhosis, r/o hepatoma FINDINGS: LIVER:  There is diffusely abnormal increased echotexture throughout the liver consistent with hepatocellular disease. Decreased through transmission of sound challenge evaluation of the hepatic parenchyma. There is a focal hypoattenuating lesion identified left hepatic lobe posteriorly with slight posterior acoustic enhancement suggestive of a cyst. BILIARY SYSTEM:  Gallbladder is not seen/visualized sonographically. RIGHT KIDNEY: No hydronephrosis PANCREAS:  Not visualized. OTHER: There is large amount ascites identified right upper quadrant, right lower quadrant and left lower quadrant. Large volume ascites.  Cirrhotic appearance of the liver with decreased through transmission of sound Gege also discussed the differential diagnosis and all of the proposed management plans with the patient and individuals accompanying the patient. Kelele Gamboa requires this high level of physician care and nursing in the Telemetry due the complexity of decision management and chance of rapid decline or death. Continued cardiac monitoring and higher level of nursing are required. I am ready available for decision making and intervention. I reviewed the relevant imaging studies and available reports. I also discussed the differential diagnosis and all of the proposed management plans with the patient and individuals accompanying the patient to this visit. I reviewed the relevant imaging studies and available reports.         Tiffany Parker DO  On 5/11/2020  8:37 AM

## 2020-05-11 NOTE — PROGRESS NOTES
[facility] Quality Flow/Interdisciplinary Rounds Progress Note        Quality Flow Rounds held on May 11, 2020    Disciplines Attending:  Bedside Nurse, ,  and Nursing Unit Leadership    Baron Medellin was admitted on 5/7/2020  2:16 PM    Anticipated Discharge Date:  Expected Discharge Date: 05/11/20    Disposition:    Lázaro Score:  Lázaro Scale Score: 18    Readmission Risk              Risk of Unplanned Readmission:        12           Discussed patient goal for the day, patient clinical progression, and barriers to discharge.   The following Goal(s) of the Day/Commitment(s) have been identified:  Para today, activity progression, RA, possible DC tomorrow       Zaid Bartlett  May 11, 2020

## 2020-05-11 NOTE — CARE COORDINATION
5/11/2020 1025 CM note: NO COVID-19 TESTING. Per [facility] outpt pharmacy, jakub requires prior auth at [phone]. Prior auth initiated, case [id number]. Pharmacists to review and will fax determination,may take up to 48hrs. Urgent response requested. Plan is home, friend/family will provide ride. Shelbie MARINO

## 2020-05-11 NOTE — CARE COORDINATION
5/11/2020 1223 CM note: NO COVID-19 TESTING. Per faxed response from [facility], authorization request for xifaxin denied. Dr Perry informed. Plan is home, friend/family will provide ride.  Shelbie MARINO

## 2020-05-11 NOTE — PROGRESS NOTES
PROGRESS NOTE    By Wyatt Kirby D.O GI Fellow    [facility]  Dr. Rashaad Lira MD, Dr. Yue Hedrick MD, Dr Julian Sanchez, Dr. Franko Perez MD, DO Adrián Smith Pages  48 y.o.  male    SUBJECTIVE: Pt resting comfortably this morning. Patient only complaint is abdominal distention    OBJECTIVE:    BP (!) 144/74   Pulse 85   Temp 98.6 °F (37 °C) (Oral)   Resp 18   Ht 5' 10\" (1.778 m)   Wt 244 lb 6.4 oz (110.9 kg)   SpO2 93%   BMI 35.07 kg/m²     Gen: NAD, AAO x 3  HEENT:PEERL, no icterus  Heart: RRR, no M/R/G  Lungs: CTAB  Abd.: Large distended abdomen nonpainful to palpation positive fluid and flank dullness  Extr.: no C/C/E, no bruising      Stool (measured) : 0 mL  Lab Results   Component Value Date    WBC 7.3 05/11/2020    WBC 6.8 05/10/2020    WBC 5.7 05/09/2020    HGB 10.7 05/11/2020    HGB 10.0 05/10/2020    HGB 9.4 05/09/2020    HCT 29.1 05/11/2020    .8 05/11/2020    RDW 18.4 05/11/2020    PLT 96 05/11/2020    PLT 91 05/10/2020    PLT 79 05/09/2020     Lab Results   Component Value Date     05/11/2020    K 3.5 05/11/2020    K 5.6 05/07/2020    CL 92 05/11/2020    CO2 29 05/11/2020    BUN 8 05/11/2020    CREATININE 0.5 05/11/2020    CALCIUM 8.2 05/11/2020    PROT 6.6 05/11/2020    LABALBU 3.1 05/11/2020    BILITOT 3.6 05/11/2020    BILITOT 4.5 05/10/2020    BILITOT 5.8 05/09/2020    ALKPHOS 105 05/11/2020    ALKPHOS 108 05/10/2020    ALKPHOS 105 05/09/2020     05/11/2020     05/10/2020     05/09/2020    ALT 31 05/11/2020    ALT 31 05/10/2020    ALT 32 05/09/2020     Lab Results   Component Value Date    LIPASE 46 05/07/2020    LIPASE 47 06/14/2017    LIPASE 125 05/05/2017     No results found for: AMYLASE      ASSESSMENT/PLAN:    1.  Decompensated Cirrhosis (ascites) / Elevated liver chemistries mixed pattern - Alcoholic Hepatitis  - last ETOH ingestion 5/7 - withdrawal management per admitting  - AST/ALT elevated in typical 2:1 ration   - MDF 38.1 Trental considered, likely of no benefit, refraining from prednisolone due to risk/questionable infection  - Discussed Alcohol cessation in more detail, pt living with his parents currently, he thinks they will be supportive  - RUQ negative for hepatoma mass, AFP - negative  - Grade 0-1 HE on exam start on Lactulose 20mg BID titrate to 2-3 soft bowel movements daily   - EGD 5/8/20 with [facility] - hx of small varices 2017 - recommend yearly EGD- Will hold Nadolol, given decompensation, pt to follow in office with EGDs for primary prophylaxis. Can have further discussion of Nadolol as outpt  - Iron studies could support hemo chrom however likely Alcohol induced elevation - will order HFE gene testing as outpt  - Negative Hep panel, need Hep B vaccine as outpt  - AI work-up negative in the past  - Paracentesis - 5/8/20 - massive ascites, ~20L removed this admission - elevated SAAG, low protein (1.4), PMN<250 - Pt likely to benefit from SBP prophylaxis upon d/c given low AFTP. - Repeat paracentesis this am - Pt ok to transition to oral cipro tomorrow. - Will give 50g albumin if >5.0L taken off   - Aldactone provided, nephrology consult for diuretic management/hyponatremia  - Pt counseled about 2gm NA diet and need from compliance   - Pt not exemption pathway LT candidate, continue to monitor for residential.      2. Acute on Chronic Macrocytic Anemia   - Hgb 10.6 at baseline   - EGD 5/9 see procedure note      3. Hyponatremia- resolved   - per admitting   - repeat paracentesis as above   - trend with daily CMP          Pt was discussed with Dr. João Harrison DO  GI Fellow   5/11/2020  9:25 AM     Patient seen and examined independently. Discussed with fellow and agree with the plan of care stated above.     Penny Pedraza DO  5/11/2020  2:02 PM

## 2020-05-11 NOTE — PROGRESS NOTES
Nephrology Note  Paula Hernandez MD      Patient seen and examined. No family member is present at bedside. Chart reviewed. Patient for paracentesis. [place] diarrhea has improved. Denies shortness of breath. Appetite fair. No nausea or dysguesia. Awake and alert . In no acute distress. Vital SignsBP (!) 144/74   Pulse 85   Temp 98.6 °F (37 °C) (Oral)   Resp 18   Ht 5' 10\" (1.778 m)   Wt 244 lb 6.4 oz (110.9 kg)   SpO2 93%   BMI 35.07 kg/m²   24HR INTAKE/OUTPUT:    Intake/Output Summary (Last 24 hours) at 5/11/2020 1146  Last data filed at 5/11/2020 0851  Gross per 24 hour   Intake 450 ml   Output 550 ml   Net -100 ml         Physical Exam    Neck: No JVD  Lungs: Breath sounds decreased at the bases. No rales or ronchi. Heart: Regular rate and rhythm. No S3 gallop. No  murmrur. Abdomen: Soft,  distended, non tender and normal bowel sounds. Extremeties: No edema.       ROS:  RESPIRATORY:  negative  CARDIOVASCULAR:  negative  GASTROINTESTINAL:  negative  GENITOURINARY:  Negative      Current Facility-Administered Medications   Medication Dose Route Frequency Provider Last Rate Last Dose    folic acid (FOLVITE) tablet 2 mg  2 mg Oral Daily Elease Jackson, DO   2 mg at 05/11/20 0751    LORazepam (ATIVAN) tablet 1 mg  1 mg Oral Q4H PRN Essence Jackson, DO        albumin human 25 % IV solution 50 g  50 g Intravenous Once Elease Jackson,  mL/hr at 05/11/20 0927 50 g at 05/11/20 0927    furosemide (LASIX) tablet 40 mg  40 mg Oral BID Essence Jackson, DO   40 mg at 05/11/20 0562    sodium chloride flush 0.9 % injection 10 mL  10 mL Intravenous 2 times per day CINTIA Reynolds CNP   10 mL at 05/11/20 0753    sodium chloride flush 0.9 % injection 10 mL  10 mL Intravenous PRN CINTIA Reynolds - DONOVAN        pantoprazole (PROTONIX) tablet 40 mg  40 mg Oral QAM AC Faith Garcia, DO   40 mg at 05/11/20 0533    perflutren lipid microspheres (DEFINITY) injection 1.65 mg  1.5 mL Intravenous ONCE PRN Abdullahi Pinto DO        lactulose (CHRONULAC) 10 GM/15ML solution 20 g  20 g Oral TID Chelle Moon MD   20 g at 05/11/20 0751    spironolactone (ALDACTONE) tablet 50 mg  50 mg Oral BID Gustabo DELAROSA Christos, DO   50 mg at 05/11/20 7544    acetaminophen (TYLENOL) tablet 650 mg  650 mg Oral Q4H PRN Jorje Angel MD        rifaximin [employer]) tablet 550 mg  550 mg Oral BID Mari Castillo Christos, DO   550 mg at 05/11/20 0751    sodium chloride flush 0.9 % injection 10 mL  10 mL Intravenous 2 times per day [facility], [facility]   10 mL at 05/10/20 0825    sodium chloride flush 0.9 % injection 10 mL  10 mL Intravenous PRN Virginia McLean, DO   10 mL at 05/08/20 0704    cefTRIAXone (ROCEPHIN) 1 g in sterile water 10 mL IV syringe  1 g Intravenous Q24H [facility] MER[facility]N Walker, DO   1 g at 05/10/20 2123    vitamin B-1 (THIAMINE) tablet 100 mg  100 mg Oral Daily [facility] MER[facility] Walker, DO   100 mg at 05/11/20 3409    guaiFENesin [facility]) extended release tablet 600 mg  600 mg Oral BID [facility] MERLY[facility] Walker, DO   600 mg at 05/11/20 1190    albuterol (PROVENTIL) nebulizer solution 2.5 mg  2.5 mg Nebulization Q4H PRN Juanjose Walker, DO   2.5 mg at 05/08/20 [address]31 [address]   Final Result   Successful ultrasound guided therapeutic paracentesis. US ABDOMEN LIMITED   Final Result   Large volume ascites. Cirrhotic appearance of the liver with decreased through transmission of   sound challenge evaluation for hepatic lesions. Nonvisualization of the gallbladder and pancreas due to body habitus and   decreased through transmission of sound. CT ABDOMEN PELVIS W IV CONTRAST Additional Contrast? None   Final Result   No evidence of pneumoperitoneum following paracentesis. Large volume of   abdominopelvic ascites remains. Cirrhosis with features of portal venous hypertension, including prominent   portosystemic collaterals. A hepatic simple cyst has increased in size since   2006.       New bubbly ground-glass opacity in the middle lobe along the fissure, outside   the field of view on comparison exam.  This is indeterminate. Although this   could represent focal infection/inflammatory focus, additional considerations   would include organizing pneumonia or adenocarcinoma spectrum lesion. Recommend follow-up CT chest in 3 months. XR ABDOMEN (KUB) (SINGLE AP VIEW)   Final Result   1. Free intraperitoneal air. 2. Multiple dilated loops of small bowel within the mid abdomen concerning   for a bowel obstruction or an ileus. 3. No acute cardiopulmonary process identified. The above findings were discussed with Harlan at 8:40 p.m. on   05/07/2020. XR CHEST STANDARD (2 VW)   Final Result   1. Free intraperitoneal air. 2. Multiple dilated loops of small bowel within the mid abdomen concerning   for a bowel obstruction or an ileus. 3. No acute cardiopulmonary process identified. The above findings were discussed with Harlan at 8:40 p.m. on   05/07/2020. US GUIDED PARACENTESIS    (Results Pending)         Labs:    CBC:   Recent Labs     05/09/20  0722 05/10/20  0751 05/11/20  0600   WBC 5.7 6.8 7.3   HGB 9.4* 10.0* 10.7*   PLT 79* 91* 96*        Lab Results   Component Value Date    IRON 109 05/07/2020    TIBC SEE NOTE (AA) 05/07/2020    FERRITIN 2,184 05/07/2020       Lab Results   Component Value Date    CALCIUM 8.2 (L) 05/11/2020    CALCIUM 8.5 (L) 05/10/2020    CALCIUM 8.6 05/09/2020    PHOS 2.5 05/08/2020    PHOS 3.8 06/15/2017    PHOS 2.8 05/07/2017    MG 1.7 05/08/2020    MG 1.6 06/15/2017    MG 1.9 05/07/2017       BMP:   Recent Labs     05/09/20  0722 05/10/20  0751 05/11/20  0600   * 132 133   K 3.5 3.5 3.5   CL 88* 92* 92*   CO2 31* 28 29   BUN 8 8 8   CREATININE 0.4* 0.4* 0.5*   GLUCOSE 131* 117* 111*             Assessment: / Plan:    1. Hyponatremia with hepatic failure. Improved. We'll follow. 2.  Anemia. Follow hemoglobins/hematocrit.   Adequate serum iron studies. 3.   Hypocalcemia. May be reflection of hypoproteinemia. We'll check ionized calcium. 4.   Ascites. Patient for paracentesis. Barrett Quince Charlee Bosworth MD  Nephrology  Electronically signed by Charlee Bosworth, MD on 5/11/2020 at 9:44 AM      Note: This report was completed utilizing computer voice recognition software. Every effort has been made to ensure accuracy, however; inadvertent computerized transcription errors may be present.

## 2020-05-12 VITALS
HEART RATE: 91 BPM | SYSTOLIC BLOOD PRESSURE: 126 MMHG | DIASTOLIC BLOOD PRESSURE: 74 MMHG | WEIGHT: 218.3 LBS | BODY MASS INDEX: 31.25 KG/M2 | TEMPERATURE: 98 F | HEIGHT: 70 IN | RESPIRATION RATE: 18 BRPM | OXYGEN SATURATION: 93 %

## 2020-05-12 LAB
ALBUMIN SERPL-MCNC: 3.1 G/DL (ref 3.5–5.2)
ALP BLD-CCNC: 109 U/L (ref 40–129)
ALT SERPL-CCNC: 30 U/L (ref 0–40)
ANION GAP SERPL CALCULATED.3IONS-SCNC: 13 MMOL/L (ref 7–16)
AST SERPL-CCNC: 134 U/L (ref 0–39)
BILIRUB SERPL-MCNC: 3.3 MG/DL (ref 0–1.2)
BLOOD CULTURE, ROUTINE: NORMAL
BUN BLDV-MCNC: 9 MG/DL (ref 6–20)
CALCIUM IONIZED: 1.15 MMOL/L (ref 1.15–1.33)
CALCIUM SERPL-MCNC: 8.5 MG/DL (ref 8.6–10.2)
CHLORIDE BLD-SCNC: 92 MMOL/L (ref 98–107)
CO2: 27 MMOL/L (ref 22–29)
CREAT SERPL-MCNC: 0.5 MG/DL (ref 0.7–1.2)
CULTURE, BLOOD 2: NORMAL
GFR AFRICAN AMERICAN: >60
GFR NON-AFRICAN AMERICAN: >60 ML/MIN/1.73
GLUCOSE BLD-MCNC: 113 MG/DL (ref 74–99)
HCT VFR BLD CALC: 29.5 % (ref 37–54)
HEMOGLOBIN: 10.7 G/DL (ref 12.5–16.5)
INR BLD: 2.1
MAGNESIUM: 1.2 MG/DL (ref 1.6–2.6)
MCH RBC QN AUTO: 39.1 PG (ref 26–35)
MCHC RBC AUTO-ENTMCNC: 36.3 % (ref 32–34.5)
MCV RBC AUTO: 107.7 FL (ref 80–99.9)
PDW BLD-RTO: 18.5 FL (ref 11.5–15)
PHOSPHORUS: 3.3 MG/DL (ref 2.5–4.5)
PLATELET # BLD: 101 E9/L (ref 130–450)
PMV BLD AUTO: 10.1 FL (ref 7–12)
POTASSIUM SERPL-SCNC: 3.3 MMOL/L (ref 3.5–5)
PROTHROMBIN TIME: 23.8 SEC (ref 9.3–12.4)
RBC # BLD: 2.74 E12/L (ref 3.8–5.8)
SODIUM BLD-SCNC: 132 MMOL/L (ref 132–146)
TOTAL PROTEIN: 6.7 G/DL (ref 6.4–8.3)
WBC # BLD: 8.7 E9/L (ref 4.5–11.5)

## 2020-05-12 PROCEDURE — 6370000000 HC RX 637 (ALT 250 FOR IP): Performed by: INTERNAL MEDICINE

## 2020-05-12 PROCEDURE — 84100 ASSAY OF PHOSPHORUS: CPT

## 2020-05-12 PROCEDURE — 97116 GAIT TRAINING THERAPY: CPT

## 2020-05-12 PROCEDURE — 82330 ASSAY OF CALCIUM: CPT

## 2020-05-12 PROCEDURE — 36415 COLL VENOUS BLD VENIPUNCTURE: CPT

## 2020-05-12 PROCEDURE — 85027 COMPLETE CBC AUTOMATED: CPT

## 2020-05-12 PROCEDURE — 6370000000 HC RX 637 (ALT 250 FOR IP): Performed by: STUDENT IN AN ORGANIZED HEALTH CARE EDUCATION/TRAINING PROGRAM

## 2020-05-12 PROCEDURE — 6360000002 HC RX W HCPCS: Performed by: INTERNAL MEDICINE

## 2020-05-12 PROCEDURE — 2580000003 HC RX 258: Performed by: NURSE PRACTITIONER

## 2020-05-12 PROCEDURE — 80053 COMPREHEN METABOLIC PANEL: CPT

## 2020-05-12 PROCEDURE — 83735 ASSAY OF MAGNESIUM: CPT

## 2020-05-12 PROCEDURE — 85610 PROTHROMBIN TIME: CPT

## 2020-05-12 PROCEDURE — 6370000000 HC RX 637 (ALT 250 FOR IP): Performed by: NURSE PRACTITIONER

## 2020-05-12 PROCEDURE — 2700000000 HC OXYGEN THERAPY PER DAY

## 2020-05-12 PROCEDURE — 2580000003 HC RX 258: Performed by: INTERNAL MEDICINE

## 2020-05-12 RX ORDER — LANOLIN ALCOHOL/MO/W.PET/CERES
400 CREAM (GRAM) TOPICAL DAILY
Qty: 30 TABLET | Refills: 0 | Status: SHIPPED | OUTPATIENT
Start: 2020-05-13

## 2020-05-12 RX ORDER — FOLIC ACID 1 MG/1
2 TABLET ORAL DAILY
Qty: 60 TABLET | Refills: 0 | Status: SHIPPED | OUTPATIENT
Start: 2020-05-13

## 2020-05-12 RX ORDER — FUROSEMIDE 40 MG/1
40 TABLET ORAL 2 TIMES DAILY
Qty: 60 TABLET | Refills: 0 | Status: SHIPPED | OUTPATIENT
Start: 2020-05-12

## 2020-05-12 RX ORDER — SPIRONOLACTONE 50 MG/1
50 TABLET, FILM COATED ORAL 2 TIMES DAILY
Qty: 60 TABLET | Refills: 0 | Status: SHIPPED | OUTPATIENT
Start: 2020-05-12

## 2020-05-12 RX ORDER — LACTULOSE 10 G/15ML
20 SOLUTION ORAL 3 TIMES DAILY
Qty: 2700 ML | Refills: 0 | Status: SHIPPED | OUTPATIENT
Start: 2020-05-12 | End: 2020-06-11

## 2020-05-12 RX ORDER — CIPROFLOXACIN 250 MG/1
250 TABLET, FILM COATED ORAL EVERY 12 HOURS SCHEDULED
Status: DISCONTINUED | OUTPATIENT
Start: 2020-05-12 | End: 2020-05-12 | Stop reason: HOSPADM

## 2020-05-12 RX ORDER — PANTOPRAZOLE SODIUM 40 MG/1
40 TABLET, DELAYED RELEASE ORAL
Qty: 30 TABLET | Refills: 0 | Status: SHIPPED | OUTPATIENT
Start: 2020-05-13

## 2020-05-12 RX ORDER — GUAIFENESIN 600 MG/1
600 TABLET, EXTENDED RELEASE ORAL 2 TIMES DAILY PRN
COMMUNITY
Start: 2020-05-12

## 2020-05-12 RX ORDER — LANOLIN ALCOHOL/MO/W.PET/CERES
100 CREAM (GRAM) TOPICAL DAILY
Qty: 30 TABLET | Refills: 0 | Status: SHIPPED | OUTPATIENT
Start: 2020-05-13

## 2020-05-12 RX ORDER — CIPROFLOXACIN 250 MG/1
250 TABLET, FILM COATED ORAL EVERY 12 HOURS SCHEDULED
Qty: 20 TABLET | Refills: 0 | Status: SHIPPED | OUTPATIENT
Start: 2020-05-12 | End: 2020-05-22

## 2020-05-12 RX ORDER — POTASSIUM CHLORIDE 20 MEQ/1
40 TABLET, EXTENDED RELEASE ORAL ONCE
Status: COMPLETED | OUTPATIENT
Start: 2020-05-12 | End: 2020-05-12

## 2020-05-12 RX ORDER — LANOLIN ALCOHOL/MO/W.PET/CERES
400 CREAM (GRAM) TOPICAL DAILY
Status: DISCONTINUED | OUTPATIENT
Start: 2020-05-12 | End: 2020-05-12 | Stop reason: HOSPADM

## 2020-05-12 RX ORDER — MAGNESIUM SULFATE 1 G/100ML
1 INJECTION INTRAVENOUS ONCE
Status: DISCONTINUED | OUTPATIENT
Start: 2020-05-12 | End: 2020-05-12 | Stop reason: CLARIF

## 2020-05-12 RX ADMIN — RIFAXIMIN 550 MG: 550 TABLET ORAL at 07:56

## 2020-05-12 RX ADMIN — Medication 100 MG: at 07:56

## 2020-05-12 RX ADMIN — FUROSEMIDE 40 MG: 40 TABLET ORAL at 07:56

## 2020-05-12 RX ADMIN — SPIRONOLACTONE 50 MG: 25 TABLET ORAL at 07:56

## 2020-05-12 RX ADMIN — POTASSIUM CHLORIDE 40 MEQ: 1500 TABLET, EXTENDED RELEASE ORAL at 09:56

## 2020-05-12 RX ADMIN — FOLIC ACID 2 MG: 1 TABLET ORAL at 07:56

## 2020-05-12 RX ADMIN — Medication 400 MG: at 08:19

## 2020-05-12 RX ADMIN — SODIUM CHLORIDE, PRESERVATIVE FREE 10 ML: 5 INJECTION INTRAVENOUS at 07:56

## 2020-05-12 RX ADMIN — PANTOPRAZOLE SODIUM 40 MG: 40 TABLET, DELAYED RELEASE ORAL at 05:28

## 2020-05-12 RX ADMIN — LACTULOSE 20 G: 20 SOLUTION ORAL at 07:56

## 2020-05-12 RX ADMIN — CIPROFLOXACIN HYDROCHLORIDE 250 MG: 250 TABLET, FILM COATED ORAL at 09:56

## 2020-05-12 RX ADMIN — GUAIFENESIN 600 MG: 600 TABLET, EXTENDED RELEASE ORAL at 07:56

## 2020-05-12 RX ADMIN — MAGNESIUM SULFATE HEPTAHYDRATE 1 G: 500 INJECTION, SOLUTION INTRAMUSCULAR; INTRAVENOUS at 09:57

## 2020-05-12 ASSESSMENT — PAIN SCALES - GENERAL: PAINLEVEL_OUTOF10: 0

## 2020-05-12 NOTE — PROGRESS NOTES
0756    sodium chloride flush 0.9 % injection 10 mL  10 mL Intravenous PRN Tilmon Germainy, APRN - CNP        pantoprazole (PROTONIX) tablet 40 mg  40 mg Oral QAM AC Max Hoose, DO   40 mg at 05/12/20 0528    perflutren lipid microspheres (DEFINITY) injection 1.65 mg  1.5 mL Intravenous ONCE PRN Gustabo Sher, DO        lactulose (CHRONULAC) 10 GM/15ML solution 20 g  20 g Oral TID Do Laird MD   20 g at 05/12/20 0756    spironolactone (ALDACTONE) tablet 50 mg  50 mg Oral BID Gustabo ISAURA Sher, DO   50 mg at 05/12/20 0756    acetaminophen (TYLENOL) tablet 650 mg  650 mg Oral Q4H PRN Ruthann Corwley MD        rifaximin Brena Floro) tablet 550 mg  550 mg Oral BID Barby Sher, DO   550 mg at 05/12/20 0756    sodium chloride flush 0.9 % injection 10 mL  10 mL Intravenous 2 times per day Collie Sherri, DO   10 mL at 05/11/20 2057    sodium chloride flush 0.9 % injection 10 mL  10 mL Intravenous PRN Collie Sherri, DO   10 mL at 05/08/20 0704    vitamin B-1 (THIAMINE) tablet 100 mg  100 mg Oral Daily [facility] MERLYN Walker, DO   100 mg at 05/12/20 0756    guaiFENesin [facility]) extended release tablet 600 mg  600 mg Oral BID [facility] MERLYN Walker, DO   600 mg at 05/12/20 0756    albuterol (PROVENTIL) nebulizer solution 2.5 mg  2.5 mg Nebulization Q4H PRN Collie Sherri, DO   2.5 mg at 05/08/20 [address] [address]   Final Result   Successful ultrasound guided therapeutic paracentesis. US GUIDED PARACENTESIS   Final Result   Successful ultrasound guided therapeutic paracentesis. US ABDOMEN LIMITED   Final Result   Large volume ascites. Cirrhotic appearance of the liver with decreased through transmission of   sound challenge evaluation for hepatic lesions. Nonvisualization of the gallbladder and pancreas due to body habitus and   decreased through transmission of sound.          CT ABDOMEN PELVIS W IV CONTRAST Additional Contrast? None   Final Result   No evidence of pneumoperitoneum following paracentesis. Large volume of   abdominopelvic ascites remains. Cirrhosis with features of portal venous hypertension, including prominent   portosystemic collaterals. A hepatic simple cyst has increased in size since   2006. New bubbly ground-glass opacity in the middle lobe along the fissure, outside   the field of view on comparison exam.  This is indeterminate. Although this   could represent focal infection/inflammatory focus, additional considerations   would include organizing pneumonia or adenocarcinoma spectrum lesion. Recommend follow-up CT chest in 3 months. XR ABDOMEN (KUB) (SINGLE AP VIEW)   Final Result   1. Free intraperitoneal air. 2. Multiple dilated loops of small bowel within the mid abdomen concerning   for a bowel obstruction or an ileus. 3. No acute cardiopulmonary process identified. The above findings were discussed with [place] at 8:40 p.m. on   05/07/2020. XR CHEST STANDARD (2 VW)   Final Result   1. Free intraperitoneal air. 2. Multiple dilated loops of small bowel within the mid abdomen concerning   for a bowel obstruction or an ileus. 3. No acute cardiopulmonary process identified. The above findings were discussed with [place] at 8:40 p.m. on   05/07/2020.                Labs:    CBC:   Recent Labs     05/10/20  0751 05/11/20  0600 05/12/20  0526   WBC 6.8 7.3 8.7   HGB 10.0* 10.7* 10.7*   PLT 91* 96* 101*        Lab Results   Component Value Date    IRON 109 05/07/2020    TIBC SEE NOTE (AA) 05/07/2020    FERRITIN 2,184 05/07/2020       Lab Results   Component Value Date    CALCIUM 8.5 (L) 05/12/2020    CALCIUM 8.2 (L) 05/11/2020    CALCIUM 8.5 (L) 05/10/2020    CAION 1.15 05/12/2020    PHOS 3.3 05/12/2020    PHOS 2.5 05/08/2020    PHOS 3.8 06/15/2017    MG 1.2 (L) 05/12/2020    MG 1.7 05/08/2020    MG 1.6 06/15/2017       BMP:   Recent Labs     05/10/20  0751 05/11/20  0600 05/12/20  0526    133 132   K 3.5 3.5 3.3*   CL 92* 92* 92*   CO2 28 29 27   BUN 8 8 9   CREATININE 0.4* 0.5* 0.5*   GLUCOSE 117* 111* 113*             Assessment: / Plan:    1. Hyponatremia with hepatic failure. Improved.       2. Hypokalemia. Will supplement. Magnesium low as well. Supplement magnesium as well. 3.     Hypomagnesemia. Supplement magnesium intravenously mixed in normal saline.      4. Hypocalcemia. May be reflection of hypoproteinemia. Normal  Ionizedcalcium. 5.    Anemia. Follow hemoglobins/hematocrit. Adequate serum iron studies.             Brittney Rushing MD  Nephrology  Electronically signed by Brittney Rushing MD on 5/12/2020 at 9:34 AM      Note: This report was completed utilizing computer voice recognition software. Every effort has been made to ensure accuracy, however; inadvertent computerized transcription errors may be present.

## 2020-05-12 NOTE — PROGRESS NOTES
PROGRESS NOTE    By Dawson Floyd D.O GI Fellow    [facility]  Dr. Robby Alvarez MD, Dr. Tracie Morales MD, Dr Krishna Curtis, Dr. Brenda Kramer MD, Dr. Oksana Hogan,       [facility] See  48 y.o.  male    SUBJECTIVE: Pt resting comfortably this morning. Patient feeling much better after paracentesis yesterday      OBJECTIVE:    /74   Pulse 91   Temp 98 °F (36.7 °C) (Oral)   Resp 18   Ht 5' 10\" (1.778 m)   Wt 218 lb 4.8 oz (99 kg)   SpO2 93%   BMI 31.32 kg/m²     Gen: NAD, AAO x 3  HEENT:PEERL, no icterus  Heart: RRR, no M/R/G  Lungs: CTAB  Abd.: Soft nontender patient with no flank dullness no positive fluid wave  Extr.: no C/C/E, no bruising      Stool (measured) : 0 mL  Lab Results   Component Value Date    WBC 8.7 05/12/2020    WBC 7.3 05/11/2020    WBC 6.8 05/10/2020    HGB 10.7 05/12/2020    HGB 10.7 05/11/2020    HGB 10.0 05/10/2020    HCT 29.5 05/12/2020    .7 05/12/2020    RDW 18.5 05/12/2020     05/12/2020    PLT 96 05/11/2020    PLT 91 05/10/2020     Lab Results   Component Value Date     05/12/2020    K 3.3 05/12/2020    K 5.6 05/07/2020    CL 92 05/12/2020    CO2 27 05/12/2020    BUN 9 05/12/2020    CREATININE 0.5 05/12/2020    CALCIUM 8.5 05/12/2020    PROT 6.7 05/12/2020    LABALBU 3.1 05/12/2020    BILITOT 3.3 05/12/2020    BILITOT 3.6 05/11/2020    BILITOT 4.5 05/10/2020    ALKPHOS 109 05/12/2020    ALKPHOS 105 05/11/2020    ALKPHOS 108 05/10/2020     05/12/2020     05/11/2020     05/10/2020    ALT 30 05/12/2020    ALT 31 05/11/2020    ALT 31 05/10/2020     Lab Results   Component Value Date    LIPASE 46 05/07/2020    LIPASE 47 06/14/2017    LIPASE 125 05/05/2017     No results found for: AMYLASE      ASSESSMENT/PLAN:    1.  Decompensated Cirrhosis (ascites) / Elevated liver chemistries mixed pattern - Alcoholic Hepatitis  - last ETOH ingestion 5/7 - withdrawal management per admitting  - AST/ALT elevated in typical 2:1 ration   - MDF 38.1 Trental considered, likely of no benefit, refraining from prednisolone due to risk/questionable infection  - Discussed Alcohol cessation in more detail, pt living with his parents currently, he thinks they will be supportive  - RUQ negative for hepatoma mass, AFP - negative  - Grade 0-1 HE on exam start on Lactulose 20mg BID titrate to 2-3 soft bowel movements daily   - EGD 5/8/20 with [facility] - hx of small varices 2017 - recommend yearly EGD- Will hold Nadolol, given decompensation, pt to follow in office with EGDs for primary prophylaxis. Can have further discussion of Nadolol as outpt  - Iron studies could support hemo chrom however likely Alcohol induced elevation - will order HFE gene testing as outpt  - Negative Hep panel, need Hep B vaccine as outpt  - AI work-up negative in the past  - Paracentesis - 5/8/20 - massive ascites, ~20L removed this admission - elevated SAAG, low protein (1.4), PMN<250 - Pt likely to benefit from SBP prophylaxis upon d/c given low AFTP. - Oral Cipro for one week   - Will give 50g albumin if >5.0L taken off   - Aldactone provided, nephrology consult for diuretic management/hyponatremia  - Pt counseled about 2gm NA diet and need from compliance   - Pt not exemption pathway LT candidate, continue to monitor for custodial.      2. Acute on Chronic Macrocytic Anemia   - Hgb 10.6 at baseline   - EGD 5/9 see procedure note      3. Hyponatremia- resolved   - per admitting   - repeat paracentesis as above   - trend with daily CMP      The need for close outpatient follow-up and titration of patient's diuretics were explained to the patient in detail. He voiced understanding. Also need for complete alcohol cessation was discussed to the patient and the risk of death associated with continued alcohol abuse. Patient voiced understanding. OK to be d/c from GI POV. Follow in office in 2-3 weeks or as needed - pt to call for appointment - [phone].       Pt was discussed with Dr. Oli Judd, DO  GI Fellow   5/12/2020  9:01 AM     Pt seen and independently examined. Pertinent notes and lab work reviewed. D/w Dr. Jemma Daigle. Agree with physical exam and A&P. OK to be d/c from GI POV. Follow in office in 2-3 weeks or earlier as needed - patient to call for appointment and with questions/concerns at [phone]. Discussed with patient - all questions answered - agreeable with the plan as delineated.   Thank you for the opportunity to participate in the care of . Evangelina Judge MD  5/12/2020  1:19 PM

## 2020-05-12 NOTE — PROGRESS NOTES
Physical Therapy Treatment Note    Room #:  0421/0421-02  Patient Name: Hailey Gruber  YOB: 1965  MRN: [id number]    Referring Provider: Antonio Felipe DO    Date of Service: 5/12/2020    Evaluating Physical Therapist:  Monalisa Bardales, PT #[id number]      Diagnosis:   Ascites [R18.8]        Patient Active Problem List   Diagnosis    Evisceration of bowel    Decompensated alcoholic cirrhosis [facility] [address]    Ascites    Hyponatremia        Tentative placement recommendation: Home Health Physical Therapy     Equipment recommendation: Patient has needed equipment       Prior Level of Function: Patient ambulated independently    Rehab Potential: good  for baseline    Past medical history:   Past Medical History:   Diagnosis Date    Alcoholism ([facility] [address]     Ascites     Cirrhosis ([facility] [address]     Hypertension     Noncompliance      Past Surgical History:   Procedure Laterality Date    ABDOMEN SURGERY      ABDOMINAL EXPLORATION SURGERY  03/22/2014    Exploration abdominal wall wound, partial omentectomy,  umbilical hernia repair, 5100cc ascitic fluid removed    ENDOSCOPY, COLON, DIAGNOSTIC      PARACENTESIS  2017 5/4-10L; 5/8-10L; 6/15-10L    PARACENTESIS  2014    3/22-5L; 3/26-4L    UPPER GASTROINTESTINAL ENDOSCOPY N/A 5/8/2020    EGD ESOPHAGOGASTRODUODENOSCOPY performed by Jaspal Wright DO at [facility] ENDOSCOPY       Precautions: falls and alarm ,  ascites    SUBJECTIVE:    Social history: Patient lives with parents  in a ranch home  with 3 steps  to enter with [employer] in shower grab bars left handed, drives, glasses    Equipment owned: Sonya [place],       [address]   How much difficulty turning over in bed?: None  How much difficulty sitting down on / standing up from a chair with arms?: A Little  How much difficulty moving from lying on back to sitting on side of bed?: None  How much help from another person moving to and from a bed to a chair?: A person, place, time and situation  and follows directions    Sensation:  Pt denies numbness and tingling to extremities  Edema:  yes bilateral lower extremities  And ascites    Vitals:  Blood Pressure at rest  Blood Pressure post session    Heart Rate at rest  Heart Rate post activity    SPO2 at rest %  SPO2 post activity % recovery      Patient education  Patient educated on role of Physical Therapy, risks of immobility and plan of care, ankle pumps, quad set and glut set for edema control, blood clot prevention, energy conservation and safety      Patient response to education:   Pt verbalized understanding Pt demonstrated skill Pt requires further education in this area   Yes Partial Yes       ASSESSMENT:    Comments:  Pt able to progress with increased distance with no LOB using a wheeled walker. Pt able to perform exercises with AROM. Treatment:  Ambulation with cane. At end of session, patient in bed with  call light and phone within reach,  all lines and tubes intact, nursing notified. His phone rang and he was left in bed talking to family. Patient would benefit from continued skilled Physical Therapy to improve functional independence and quality of life. PLAN:    Patient is making fair progress towards established goals. Will continue with current Plan of care. Time in  0918  Time out  0930    Total Treatment Time  12 minutes    CPT codes:  Therapeutic activities (97987)   12 minutes  1 unit(s)    Fanny Grover PTA  Lic.  #[id number]

## 2020-05-12 NOTE — DISCHARGE SUMMARY
Internal Medicine  Discharge Summary    NAME: Glenys Hamman  :  1965  MRN:  [id number]  PCP:No primary care provider on file. ADMITTED: 2020      DISCHARGED: 20    ADMITTING PHYSICIAN: Shannan Sher DO    CONSULTANT(S):   IP CONSULT TO INTERNAL MEDICINE  IP CONSULT TO GI  IP CONSULT TO GENERAL SURGERY  PHARMACY TO DOSE VANCOMYCIN  PHARMACY TO DOSE VANCOMYCIN  IP CONSULT TO NEPHROLOGY  IP CONSULT TO NEPHROLOGY     ADMITTING DIAGNOSIS:   Ascites [R18.8]     DISCHARGE DIAGNOSES:   1. Massive ascites due to severely decompensated alcoholic cirrhosis that is post paracentesis x3 with cumulative fluid removal of nearly 30 L  2. Acute on chronic alcohol abuse without overt signs of alcohol withdrawal  3. Hyperammonemia without overt signs of hepatic encephalopathy  4. Macrocytic anemia due to alcohol abuse  5. Hypomagnesemia secondary to GI loss and large volume diuresis/paracentesis   6. Hyperbilirubinemia  7. Hypoosmolar, hypervolemic, hyponatremia  8. Non-oxygen dependent COPD with chronic respiratory failure  9. History of varices  10. Tobacco abuse  11. Severe outpatient noncompliance    BRIEF HISTORY OF PRESENT ILLNESS:   Glenys Hamman is a 48 y.o. male who presents to [facility] ER complaining of abdominal distention.     Glenys Hamman has a past medical history that includes alcoholic cirrhosis with massive ascites, alcohol abuse.      Maciej Rosales presents to the ER with complaints of progressive abdominal distention and pain. He does have cirrhosis due to alcoholism and massive ascites. He requires paracentesis and his last paracentesis he states was in January of this year at [facility]. He states that since then he has gained 60 to 80 pounds. He states he has not been on any medications at home. He says he does not have a PCP. He admits to drinking 3 glasses of wine nightly. He states in the past he used to drink three gallons per week. He denies any fever or chills.  He does have cough but TWO XRAY VIEWS OF THE CHEST 5/7/2020 8:18 pm COMPARISON: None. HISTORY: ORDERING SYSTEM PROVIDED HISTORY: distention lactic acidosis TECHNOLOGIST PROVIDED HISTORY: Reason for exam:->distention lactic acidosis FINDINGS: Chest-two views: There is free intraperitoneal air. The mediastinal and cardiac contours are normal.  The lungs are clear. There is no pleural effusion or pneumothorax identified. Abdomen: There are multiple abnormally dilated loops of small bowel within the left mid abdomen. There is a paucity of air-filled distal colon. 1. Free intraperitoneal air. 2. Multiple dilated loops of small bowel within the mid abdomen concerning for a bowel obstruction or an ileus. 3. No acute cardiopulmonary process identified. The above findings were discussed with Rayo Gongora at 8:40 p.m. on 05/07/2020. Xr Abdomen (kub) (single Ap View)    Result Date: 5/7/2020  EXAMINATION: ONE SUPINE XRAY VIEW(S) OF THE ABDOMEN; TWO XRAY VIEWS OF THE CHEST 5/7/2020 8:18 pm COMPARISON: None. HISTORY: ORDERING SYSTEM PROVIDED HISTORY: distention lactic acidosis TECHNOLOGIST PROVIDED HISTORY: Reason for exam:->distention lactic acidosis FINDINGS: Chest-two views: There is free intraperitoneal air. The mediastinal and cardiac contours are normal.  The lungs are clear. There is no pleural effusion or pneumothorax identified. Abdomen: There are multiple abnormally dilated loops of small bowel within the left mid abdomen. There is a paucity of air-filled distal colon. 1. Free intraperitoneal air. 2. Multiple dilated loops of small bowel within the mid abdomen concerning for a bowel obstruction or an ileus. 3. No acute cardiopulmonary process identified. The above findings were discussed with Rayo Gongora at 8:40 p.m. on 05/07/2020.      Ct Abdomen Pelvis W Iv Contrast Additional Contrast? None    Result Date: 5/8/2020  EXAMINATION: CT OF THE ABDOMEN AND PELVIS WITH CONTRAST 5/7/2020 10:12 pm TECHNIQUE: CT of the appearance of the liver with decreased through transmission of sound challenge evaluation for hepatic lesions. Nonvisualization of the gallbladder and pancreas due to body habitus and decreased through transmission of sound. Us Guided Paracentesis    Result Date: 5/11/2020  PROCEDURE: ULTRASOUND GUIDED PARACENTESIS 5/11/2020 HISTORY: ORDERING SYSTEM PROVIDED HISTORY: Paracentesis 5/11 TECHNOLOGIST PROVIDED HISTORY: Reason for exam:->Paracentesis 5/11 TECHNIQUE: Informed consent was obtained after a detailed explanation of the procedure including risks, benefits, and alternatives. Universal protocol was followed. The right abdomen was prepped and draped in sterile fashion and local anesthesia was achieved with lidocaine. An 8 Yoruba needle sheath was advanced under ultrasound guidance into ascites and paracentesis was performed. The patient tolerated the procedure well. FINDINGS: A total of 9700 mL was removed. Fluid was straw-colored and non turbid. Albumin to be administered on the floor IV. Successful ultrasound guided therapeutic paracentesis. Us Guided Paracentesis    Result Date: 5/8/2020  PROCEDURE: ULTRASOUND GUIDED PARACENTESIS 5/8/2020 HISTORY: ORDERING SYSTEM PROVIDED HISTORY: paracentesis, had ~4 L removed in ER 5/7 TECHNOLOGIST PROVIDED HISTORY: Reason for exam:->paracentesis, had ~4 L removed in ER 5/7 TECHNIQUE: Informed consent was obtained after a detailed explanation of the procedure including risks, benefits, and alternatives. Universal protocol was followed. The right abdomen was prepped and draped in sterile fashion and local anesthesia was achieved with lidocaine. An 5 Yoruba needle sheath was advanced under ultrasound guidance into ascites and paracentesis was performed. The patient tolerated the procedure well. Patient received albumin (preferably 100 g) IV on the floor. FINDINGS: A total of 15.3 L was removed. Fluid was straw-colored non turbid.      Successful ultrasound guided therapeutic paracentesis. HOSPITAL COURSE:   Yuly George was admitted due to complications of decompensated alcoholic cirrhosis including massive ascites. During the first 24 hours of his hospitalization, he had the removal of 20 L of fluid via paracentesis and he tolerated this well without any renal insufficiency. He was observed in the hospital for 3 consecutive days and did not develop any overt symptoms of alcohol withdrawal.  We discussed the absolute need for alcohol cessation. He was medically optimized and was evaluated by gastroenterology. He was started on appropriate cirrhotic medication regimen which was titrated for the appropriate number of bowel movements. His ammonia level improved with treatment. He underwent additional paracentesis with removal of nearly 10 L of fluid for a cumulative 30 L total.  We discussed the absolute need for continued alcohol cessation and we will work to arrange for outpatient paracentesis weekly. He understands the need for outpatient follow-up with primary care provider and gastroenterology. Electrolyte abnormalities were are addressed and appropriate prescriptions were provided at the time of discharge. BRIEF PHYSICAL EXAMINATION AND LABORATORIES ON DAY OF DISCHARGE:  VITALS:  /74   Pulse 91   Temp 98 °F (36.7 °C) (Oral)   Resp 18   Ht 5' 10\" (1.778 m)   Wt 218 lb 4.8 oz (99 kg)   SpO2 93%   BMI 31.32 kg/m²     HEENT:  PERRLA. EOMI. Sclera clear. Buccal mucosa moist.    Neck:  Supple. Trachea midline. No thyromegaly. No JVD. No bruits. Heart:  Rhythm regular, rate controlled. No murmurs. Lungs:  Symmetrical. Clear to auscultation bilaterally. No wheezes. No rhonchi. No rales. Abdomen: Soft. Nontender. Rounded distention due to ascites however this is significantly improved in comparison to previous examinations. Bowel sounds positive. No organomegaly or masses.   No pain on palpation    Extremities: is instructed to follow-up with his primary care physician. · Patient is instructed to follow-up with the consults listed above as directed by them. · he is instructed to resume home medications and take new medications as indicated in the list above. · If the patient has a recurrence of symptoms, he is instructed to go to the ED. Preparing for this patient's discharge, including paperwork, orders, instructions, and meeting with patient did require > 30 minutes. CINTIA Jenkins CNP   5/12/2020  9:00 AM     Tommy Barrios underwent paracentesis x3 during the hospitalization with removal of greater than 30 L of fluid. His renal function remained intact. His cirrhotic medications have been maximized. Unfortunately, he does not meet criteria for coverage of Xifaxan therapy as an outpatient. Otherwise, he will be arranged for weekly paracentesis as per the GI team.  We discussed the absolute need for alcohol cessation. He was deemed acceptable for discharge home. I saw, examined, and discussed the patient with the resident/nurse practioner and agree with their assessment and plan.     Electronically signed by Madeline Lee DO on 5/12/2020 at 9:09 AM

## 2020-05-12 NOTE — PROGRESS NOTES
[facility] Quality Flow/Interdisciplinary Rounds Progress Note        Quality Flow Rounds held on May 12, 2020    Disciplines Attending:  Bedside Nurse, ,  and Nursing Unit Leadership    Glenys Hamman was admitted on 5/7/2020  2:16 PM    Anticipated Discharge Date:  Expected Discharge Date: 05/12/20    Disposition:    Lázaro Score:  Lázaro Scale Score: 19    Readmission Risk              Risk of Unplanned Readmission:        14           Discussed patient goal for the day, patient clinical progression, and barriers to discharge.   The following Goal(s) of the Day/Commitment(s) have been identified:  Activity progression, PT/OT, weekly para on DC, Plan is [address]  May 12, 2020

## 2020-05-12 NOTE — CARE COORDINATION
5-12-Cm note: met with patient for transition of care needs, pt denies any dc needs, including home health care.  Electronically signed by Jaylin Moon RN on 5/12/2020 at 10:14 AM

## 2020-05-13 LAB — VITAMIN B6: 8.7 NMOL/L (ref 20–125)

## (undated) DEVICE — LUBRICANT SURG JELLY ST BACTER TUBE 4.25OZ

## (undated) DEVICE — KENDALL 450 SERIES MONITORING FOAM ELECTRODE - RECTANGULAR SHAPE ( 3/PK): Brand: KENDALL

## (undated) DEVICE — BLOCK BITE 60FR CAREGUARD

## (undated) DEVICE — TOWEL,OR,DSP,ST,BLUE,STD,6/PK,12PK/CS: Brand: MEDLINE

## (undated) DEVICE — COVER,LIGHT HANDLE,FLX,1/PK: Brand: MEDLINE INDUSTRIES, INC.

## (undated) DEVICE — YANKAUER,BULB TIP,W/O VENT,RIGID,STERILE: Brand: MEDLINE

## (undated) DEVICE — GOWN ISOLATN REG YEL M WT MULTIPLY SIDETIE LEV 2

## (undated) DEVICE — SPONGE GZ 4IN 4IN 4 PLY N WVN AVANT

## (undated) DEVICE — KIT BEDSIDE REVITAL OX 500ML

## (undated) DEVICE — TUBING, SUCTION, 1/4" X 10', STRAIGHT: Brand: MEDLINE

## (undated) DEVICE — CONTAINER SPEC COLL 960ML POLYPR TRIANG GRAD INTAKE/OUTPUT

## (undated) DEVICE — MASK,FACE,MAXFLUIDPROTECT,SHIELD/ERLPS: Brand: MEDLINE

## (undated) DEVICE — Device: Brand: DEFENDO VALVE AND CONNECTOR KIT